# Patient Record
Sex: MALE | Race: WHITE | Employment: OTHER | URBAN - NONMETROPOLITAN AREA
[De-identification: names, ages, dates, MRNs, and addresses within clinical notes are randomized per-mention and may not be internally consistent; named-entity substitution may affect disease eponyms.]

---

## 2017-02-17 DIAGNOSIS — I10 HTN (HYPERTENSION): ICD-10-CM

## 2017-02-17 DIAGNOSIS — K21.9 ESOPHAGEAL REFLUX: ICD-10-CM

## 2017-02-17 RX ORDER — METOPROLOL SUCCINATE 25 MG/1
TABLET, EXTENDED RELEASE ORAL
Qty: 90 TABLET | Refills: 2 | Status: SHIPPED | OUTPATIENT
Start: 2017-02-17 | End: 2017-11-14

## 2017-11-14 DIAGNOSIS — I10 HTN (HYPERTENSION): ICD-10-CM

## 2017-11-14 DIAGNOSIS — K21.9 ESOPHAGEAL REFLUX: ICD-10-CM

## 2017-11-16 RX ORDER — METOPROLOL SUCCINATE 25 MG/1
TABLET, EXTENDED RELEASE ORAL
Qty: 90 TABLET | Refills: 0 | Status: SHIPPED | OUTPATIENT
Start: 2017-11-16 | End: 2018-02-11

## 2017-11-16 NOTE — TELEPHONE ENCOUNTER
Toprol XL      Last Written Prescription Date: 2/17/2017  Last Fill Quantity: 90,  # refills: 2   Last Office Visit with [facility], [facility] or [facility] prescribing provider: 11/28/2016    Prilosec      Last Written Prescription Date: 2/17/2017  Last Fill Quantity: 90,  # refills: 2   Last Office Visit with [facility], [facility] or [facility] prescribing provider: 11/28/2016

## 2017-12-15 ENCOUNTER — OFFICE VISIT (OUTPATIENT)
Dept: FAMILY MEDICINE | Facility: OTHER | Age: 62
End: 2017-12-15
Attending: FAMILY MEDICINE
Payer: COMMERCIAL

## 2017-12-15 ENCOUNTER — RADIANT APPOINTMENT (OUTPATIENT)
Dept: GENERAL RADIOLOGY | Facility: OTHER | Age: 62
End: 2017-12-15
Attending: FAMILY MEDICINE
Payer: COMMERCIAL

## 2017-12-15 VITALS
DIASTOLIC BLOOD PRESSURE: 78 MMHG | TEMPERATURE: 96.9 F | HEART RATE: 73 BPM | WEIGHT: 235 LBS | OXYGEN SATURATION: 93 % | BODY MASS INDEX: 34.8 KG/M2 | RESPIRATION RATE: 18 BRPM | HEIGHT: 69 IN | SYSTOLIC BLOOD PRESSURE: 126 MMHG

## 2017-12-15 DIAGNOSIS — M25.551 HIP PAIN, RIGHT: ICD-10-CM

## 2017-12-15 DIAGNOSIS — M25.551 HIP PAIN, RIGHT: Primary | ICD-10-CM

## 2017-12-15 DIAGNOSIS — I10 ESSENTIAL HYPERTENSION: ICD-10-CM

## 2017-12-15 DIAGNOSIS — Z00.00 ROUTINE GENERAL MEDICAL EXAMINATION AT A HEALTH CARE FACILITY: ICD-10-CM

## 2017-12-15 DIAGNOSIS — K21.9 GASTROESOPHAGEAL REFLUX DISEASE, ESOPHAGITIS PRESENCE NOT SPECIFIED: ICD-10-CM

## 2017-12-15 DIAGNOSIS — Z12.5 SCREENING FOR PROSTATE CANCER: ICD-10-CM

## 2017-12-15 DIAGNOSIS — Z12.11 SPECIAL SCREENING FOR MALIGNANT NEOPLASMS, COLON: ICD-10-CM

## 2017-12-15 LAB
ALBUMIN SERPL-MCNC: 4 G/DL (ref 3.4–5)
ALBUMIN UR-MCNC: 10 MG/DL
ALP SERPL-CCNC: 57 U/L (ref 40–150)
ALT SERPL W P-5'-P-CCNC: 35 U/L (ref 0–70)
ANION GAP SERPL CALCULATED.3IONS-SCNC: 4 MMOL/L (ref 3–14)
APPEARANCE UR: CLEAR
AST SERPL W P-5'-P-CCNC: 26 U/L (ref 0–45)
BACTERIA #/AREA URNS HPF: ABNORMAL /HPF
BASOPHILS # BLD AUTO: 0.1 10E9/L (ref 0–0.2)
BASOPHILS NFR BLD AUTO: 0.9 %
BILIRUB SERPL-MCNC: 0.5 MG/DL (ref 0.2–1.3)
BILIRUB UR QL STRIP: NEGATIVE
BUN SERPL-MCNC: 16 MG/DL (ref 7–30)
CALCIUM SERPL-MCNC: 8.6 MG/DL (ref 8.5–10.1)
CHLORIDE SERPL-SCNC: 104 MMOL/L (ref 94–109)
CHOLEST SERPL-MCNC: 184 MG/DL
CO2 SERPL-SCNC: 31 MMOL/L (ref 20–32)
COLOR UR AUTO: YELLOW
CREAT SERPL-MCNC: 1.16 MG/DL (ref 0.66–1.25)
DIFFERENTIAL METHOD BLD: NORMAL
EOSINOPHIL # BLD AUTO: 0.1 10E9/L (ref 0–0.7)
EOSINOPHIL NFR BLD AUTO: 1.8 %
ERYTHROCYTE [DISTWIDTH] IN BLOOD BY AUTOMATED COUNT: 12.4 % (ref 10–15)
GFR SERPL CREATININE-BSD FRML MDRD: 63 ML/MIN/1.7M2
GLUCOSE SERPL-MCNC: 105 MG/DL (ref 70–99)
GLUCOSE UR STRIP-MCNC: NEGATIVE MG/DL
HCT VFR BLD AUTO: 47.9 % (ref 40–53)
HDLC SERPL-MCNC: 52 MG/DL
HGB BLD-MCNC: 16.6 G/DL (ref 13.3–17.7)
HGB UR QL STRIP: NEGATIVE
IMM GRANULOCYTES # BLD: 0 10E9/L (ref 0–0.4)
IMM GRANULOCYTES NFR BLD: 0.2 %
KETONES UR STRIP-MCNC: NEGATIVE MG/DL
LDLC SERPL CALC-MCNC: 96 MG/DL
LEUKOCYTE ESTERASE UR QL STRIP: NEGATIVE
LYMPHOCYTES # BLD AUTO: 1.6 10E9/L (ref 0.8–5.3)
LYMPHOCYTES NFR BLD AUTO: 29.2 %
MCH RBC QN AUTO: 32.4 PG (ref 26.5–33)
MCHC RBC AUTO-ENTMCNC: 34.7 G/DL (ref 31.5–36.5)
MCV RBC AUTO: 94 FL (ref 78–100)
MONOCYTES # BLD AUTO: 0.5 10E9/L (ref 0–1.3)
MONOCYTES NFR BLD AUTO: 10 %
MUCOUS THREADS #/AREA URNS LPF: PRESENT /LPF
NEUTROPHILS # BLD AUTO: 3.1 10E9/L (ref 1.6–8.3)
NEUTROPHILS NFR BLD AUTO: 57.9 %
NITRATE UR QL: NEGATIVE
NONHDLC SERPL-MCNC: 132 MG/DL
NRBC # BLD AUTO: 0 10*3/UL
NRBC BLD AUTO-RTO: 0 /100
PH UR STRIP: 6 PH (ref 4.7–8)
PLATELET # BLD AUTO: 218 10E9/L (ref 150–450)
POTASSIUM SERPL-SCNC: 4 MMOL/L (ref 3.4–5.3)
PROT SERPL-MCNC: 7.6 G/DL (ref 6.8–8.8)
PSA SERPL-ACNC: 1.32 UG/L (ref 0–4)
RBC # BLD AUTO: 5.12 10E12/L (ref 4.4–5.9)
RBC #/AREA URNS AUTO: 1 /HPF (ref 0–2)
SODIUM SERPL-SCNC: 139 MMOL/L (ref 133–144)
SOURCE: ABNORMAL
SP GR UR STRIP: 1.02 (ref 1–1.03)
TRIGL SERPL-MCNC: 181 MG/DL
TSH SERPL DL<=0.005 MIU/L-ACNC: 2.04 MU/L (ref 0.4–4)
UROBILINOGEN UR STRIP-MCNC: NORMAL MG/DL (ref 0–2)
WBC # BLD AUTO: 5.4 10E9/L (ref 4–11)
WBC #/AREA URNS AUTO: <1 /HPF (ref 0–2)

## 2017-12-15 PROCEDURE — 80061 LIPID PANEL: CPT | Performed by: FAMILY MEDICINE

## 2017-12-15 PROCEDURE — 80050 GENERAL HEALTH PANEL: CPT | Performed by: FAMILY MEDICINE

## 2017-12-15 PROCEDURE — 81001 URINALYSIS AUTO W/SCOPE: CPT | Performed by: FAMILY MEDICINE

## 2017-12-15 PROCEDURE — 36415 COLL VENOUS BLD VENIPUNCTURE: CPT | Performed by: FAMILY MEDICINE

## 2017-12-15 PROCEDURE — 73502 X-RAY EXAM HIP UNI 2-3 VIEWS: CPT | Mod: TC

## 2017-12-15 PROCEDURE — G0103 PSA SCREENING: HCPCS | Performed by: FAMILY MEDICINE

## 2017-12-15 PROCEDURE — 99396 PREV VISIT EST AGE 40-64: CPT | Performed by: FAMILY MEDICINE

## 2017-12-15 ASSESSMENT — ANXIETY QUESTIONNAIRES
6. BECOMING EASILY ANNOYED OR IRRITABLE: SEVERAL DAYS
3. WORRYING TOO MUCH ABOUT DIFFERENT THINGS: SEVERAL DAYS
7. FEELING AFRAID AS IF SOMETHING AWFUL MIGHT HAPPEN: NOT AT ALL
2. NOT BEING ABLE TO STOP OR CONTROL WORRYING: SEVERAL DAYS
5. BEING SO RESTLESS THAT IT IS HARD TO SIT STILL: SEVERAL DAYS
1. FEELING NERVOUS, ANXIOUS, OR ON EDGE: SEVERAL DAYS
IF YOU CHECKED OFF ANY PROBLEMS ON THIS QUESTIONNAIRE, HOW DIFFICULT HAVE THESE PROBLEMS MADE IT FOR YOU TO DO YOUR WORK, TAKE CARE OF THINGS AT HOME, OR GET ALONG WITH OTHER PEOPLE: NOT DIFFICULT AT ALL
4. TROUBLE RELAXING: SEVERAL DAYS
GAD7 TOTAL SCORE: 6

## 2017-12-15 ASSESSMENT — PATIENT HEALTH QUESTIONNAIRE - PHQ9: SUM OF ALL RESPONSES TO PHQ QUESTIONS 1-9: 4

## 2017-12-15 ASSESSMENT — PAIN SCALES - GENERAL: PAINLEVEL: NO PAIN (0)

## 2017-12-15 NOTE — MR AVS SNAPSHOT
After Visit Summary   12/15/2017    Luis Perez    MRN: [id number]           Patient Information     Date Of Birth          1955        Visit Information        Provider Department      12/15/2017 9:00 AM Kenneth Yuan MD [facility] [place]        Today's Diagnoses     Hip pain, right    -  1    Comprehensive Medical Examination        Screening for prostate cancer        Special screening for malignant neoplasms, colon        HTN (hypertension)        GERD          Care Instructions      Preventive Health Recommendations  Male Ages 50 - 64    Yearly exam:             See your health care provider every year in order to  o   Review health changes.   o   Discuss preventive care.    o   Review your medicines if your doctor has prescribed any.     Have a cholesterol test every 5 years, or more frequently if you are at risk for high cholesterol/heart disease.     Have a diabetes test (fasting glucose) every three years. If you are at risk for diabetes, you should have this test more often.     Have a colonoscopy at age 50, or have a yearly FIT test (stool test). These exams will check for colon cancer.      Talk with your health care provider about whether or not a prostate cancer screening test (PSA) is right for you.    You should be tested each year for STDs (sexually transmitted diseases), if you re at risk.     Shots: Get a flu shot each year. Get a tetanus shot every 10 years.     Nutrition:    Eat at least 5 servings of fruits and vegetables daily.     Eat whole-grain bread, whole-wheat pasta and brown rice instead of white grains and rice.     Talk to your provider about Calcium and Vitamin D.     Lifestyle    Exercise for at least 150 minutes a week (30 minutes a day, 5 days a week). This will help you control your weight and prevent disease.     Limit alcohol to one drink per day.     No smoking.     Wear sunscreen to prevent skin cancer.     See your dentist every six months  for an exam and cleaning.     See your eye doctor every 1 to 2 years.            Follow-ups after your visit        Additional Services     GENERAL SURG ADULT REFERRAL       Your provider has referred you to: [facility]: [facility] [place] [phone]   [email or web address]    Please be aware that coverage of these services is subject to the terms and limitations of your health insurance plan.  Call member services at your health plan with any benefit or coverage questions.      Please bring the following with you to your appointment:    (1) Any X-Rays, CTs or MRIs which have been performed.  Contact the facility where they were done to arrange for  prior to your scheduled appointment.   (2) List of current medications   (3) This referral request   (4) Any documents/labs given to you for this referral                  Follow-up notes from your care team     Return in about 1 year (around 12/15/2018) for Comprehensive Exam.      Who to contact     If you have questions or need follow up information about today's clinic visit or your schedule please [facility] at [phone].  Normal or non-critical lab and imaging results will be communicated to you by MyChart, letter or phone within 4 business days after the clinic has received the results. If you do not hear from us within 7 days, please contact the clinic through Spazzleshart or phone. If you have a critical or abnormal lab result, we will notify you by phone as soon as possible.  Submit refill requests through Zoomdata or call your pharmacy and they will forward the refill request to us. Please allow 3 business days for your refill to be completed.          Additional Information About Your Visit        Spazzleshart Information     Zoomdata gives you secure access to your electronic health record. If you see a primary care provider, you can also send messages to your care team  "and make appointments. If you have questions, please call your primary care clinic.  If you do not have a primary care provider, please call [phone] and they will assist you.        Care EveryWhere ID     This is your Care EveryWhere ID. This could be used by other organizations to access your [facility] medical records  [id number]        Your Vitals Were     Pulse Temperature Respirations Height Pulse Oximetry BMI (Body Mass Index)    73 96.9  F (36.1  C) (Tympanic) 18 5' 8.5\" (1.74 m) 93% 35.21 kg/m2       Blood Pressure from Last 3 Encounters:   12/15/17 126/78   11/28/16 130/88   11/18/15 120/76    Weight from Last 3 Encounters:   12/15/17 235 lb (106.6 kg)   11/28/16 240 lb (108.9 kg)   11/18/15 246 lb (111.6 kg)              We Performed the Following     CBC with platelets differential     Comprehensive metabolic panel     GENERAL SURG ADULT REFERRAL     Lipid Profile     Prostate spec antigen screen     TSH with free T4 reflex     UA reflex to Microscopic and Culture        Primary Care Provider Office Phone # Fax #    Kenneth Yuan [phone]       [facility] [address] [facility] [address]        Equal Access to Services     SINDI WOOD AH: Hadii ras ku hadasho Soomaali, waaxda luqadaha, qaybta kaalmada adeegyada, jason alcazar. So [facility] [phone].    ATENCIÓN: Si habla español, tiene a smith disposición servicios gratuitos de asistencia lingüística. Llame al [phone].    We comply with applicable federal civil rights laws and [place] laws. We do not discriminate on the basis of race, color, national origin, age, disability, sex, sexual orientation, or gender identity.            Thank you!     Thank you for choosing [facility]  for your care. Our goal is always to provide you with excellent care. Hearing back from our patients is one way we can continue to improve our services. Please take a few minutes to complete the written " survey that you may receive in the mail after your visit with us. Thank you!             Your Updated Medication List - Protect others around you: Learn how to safely use, store and throw away your medicines at www.disposemymeds.org.          This list is accurate as of: 12/15/17 11:59 PM.  Always use your most recent med list.                   Brand Name Dispense Instructions for use Diagnosis    ASPIRIN LOW DOSE 81 MG EC tablet   Generic drug:  aspirin      Take 1 tablet by mouth daily.        FLAXSEED OIL PO      Take 1,400 mg by mouth daily        metoprolol 25 MG 24 hr tablet    TOPROL-XL    90 tablet    TAKE 1 TABLET DAILY    HTN (hypertension)       omeprazole 20 MG CR capsule    priLOSEC    90 capsule    TAKE 1 CAPSULE DAILY BEFORE A MEAL    Esophageal reflux       vitamin D3 2000 UNITS Caps      Take 1 capsule by mouth daily

## 2017-12-15 NOTE — NURSING NOTE
"Chief Complaint   Patient presents with     Physical       Initial /78 (BP Location: Right arm, Patient Position: Sitting, Cuff Size: Adult Regular)  Pulse 73  Temp 96.9  F (36.1  C) (Tympanic)  Resp 18  Ht 5' 8.5\" (1.74 m)  Wt 235 lb (106.6 kg)  SpO2 93%  BMI 35.21 kg/m2 Estimated body mass index is 35.21 kg/(m^2) as calculated from the following:    Height as of this encounter: 5' 8.5\" (1.74 m).    Weight as of this encounter: 235 lb (106.6 kg).  Medication Reconciliation: complete   Jennifer Cabrera LPN      "

## 2017-12-15 NOTE — PROGRESS NOTES
SUBJECTIVE:   CC: Luis Perez is an 62 year old male who presents for preventative health visit.     Healthy Habits:    Do you get at least three servings of calcium containing foods daily (dairy, green leafy vegetables, etc.)? yes    Amount of exercise or daily activities, outside of work: 2-3 day(s) per week    Problems taking medications regularly No    Medication side effects: No    Have you had an eye exam in the past two years? yes    Do you see a dentist twice per year? yes    Do you have sleep apnea, excessive snoring or daytime drowsiness?no        Today's PHQ-2 Score: PHQ-2 ( 1999 Pfizer) 12/12/2017 11/18/2014   Q1: Little interest or pleasure in doing things 1 0   Q2: Feeling down, depressed or hopeless 0 0   PHQ-2 Score 1 0   Q1: Little interest or pleasure in doing things Several days -   Q2: Feeling down, depressed or hopeless Not at all -   PHQ-2 Score 1 -         Abuse: Current or Past(Physical, Sexual or Emotional)- No  Do you feel safe in your environment - Yes  Social History   Substance Use Topics     Smoking status: Light Tobacco Smoker     Years: 37.00     Types: Cigars     Smokeless tobacco: Never Used     Alcohol use Yes      Comment: 2 glasses wine daily      If you drink alcohol do you typically have >3 drinks per day or >7 drinks per week? No                      Last PSA:   PSA   Date Value Ref Range Status   11/28/2016 1.30 0 - 4 ug/L Final     Comment:     Assay Method:  Chemiluminescence using Siemens Vista analyzer       Reviewed orders with patient. Reviewed health maintenance and updated orders accordingly - Yes  Patient Active Problem List   Diagnosis     Advanced care planning/counseling discussion     HTN (hypertension)     GERD     Comprehensive Medical Examination     Past Surgical History:   Procedure Laterality Date     COLONOSCOPY  9-     UPPER GI ENDOSCOPY         Social History   Substance Use Topics     Smoking status: Light Tobacco Smoker     Years: 37.00      Types: Cigars     Smokeless tobacco: Never Used     Alcohol use Yes      Comment: 2 glasses wine daily     Family History   Problem Relation Age of Onset     Allergies Sister      Allergies Brother      Asthma Brother      CANCER Father      Non-Hodgkin's lymphoma - cause of death     DIABETES Mother      Hypertension Mother      Hypertension Brother      HEART DISEASE No family hx of      Thyroid Disease No family hx of      Cancer - colorectal No family hx of          Current Outpatient Prescriptions   Medication Sig Dispense Refill     metoprolol (TOPROL-XL) 25 MG 24 hr tablet TAKE 1 TABLET DAILY 90 tablet 0     omeprazole (PRILOSEC) 20 MG CR capsule TAKE 1 CAPSULE DAILY BEFORE A MEAL 90 capsule 0     Flaxseed, Linseed, (FLAXSEED OIL PO) Take 1,400 mg by mouth daily       Cholecalciferol (VITAMIN D3) 2000 UNITS CAPS Take 1 capsule by mouth daily       aspirin (ASPIRIN LOW DOSE) 81 MG EC tablet Take 1 tablet by mouth daily.       No Known Allergies      Reviewed and updated as needed this visit by clinical staffTobacco  Allergies  Meds         Reviewed and updated as needed this visit by Provider        Past Medical History:   Diagnosis Date     GERD 2/22/2000     Hypertension 6/3/2005      Past Surgical History:   Procedure Laterality Date     COLONOSCOPY  9-     UPPER GI ENDOSCOPY         ROS:  C: NEGATIVE for fever, chills, change in weight  I: NEGATIVE for worrisome rashes, moles or lesions  E: NEGATIVE for vision changes or irritation  ENT: NEGATIVE for ear, mouth and throat problems  R: NEGATIVE for significant cough or SOB  CV: NEGATIVE for chest pain, palpitations or peripheral edema  GI: NEGATIVE for nausea, abdominal pain, heartburn, or change in bowel habits   male: negative for dysuria, hematuria, decreased urinary stream, erectile dysfunction, urethral discharge  M: NEGATIVE for significant arthralgias or myalgia  N: NEGATIVE for weakness, dizziness or paresthesias  P: NEGATIVE for changes  "in mood or affect    OBJECTIVE:   /78 (BP Location: Right arm, Patient Position: Sitting, Cuff Size: Adult Regular)  Pulse 73  Temp 96.9  F (36.1  C) (Tympanic)  Resp 18  Ht 5' 8.5\" (1.74 m)  Wt 235 lb (106.6 kg)  SpO2 93%  BMI 35.21 kg/m2  EXAM:  Physical Exam   Constitutional: He is oriented to person, place, and time. He appears well-developed and well-nourished. No distress.   HENT:   Head: Normocephalic and atraumatic.   Right Ear: External ear normal.   Left Ear: External ear normal.   Nose: Nose normal.   Mouth/Throat: Oropharynx is clear and moist.   Eyes: Conjunctivae and EOM are normal. Pupils are equal, round, and reactive to light.   Neck: Normal range of motion. Neck supple. No JVD present. No thyromegaly present.   Cardiovascular: Normal rate, regular rhythm, normal heart sounds and intact distal pulses.  Exam reveals no gallop and no friction rub.    No murmur heard.  Pulmonary/Chest: Effort normal and breath sounds normal. He has no wheezes. He has no rales.   Abdominal: Soft. Bowel sounds are normal. He exhibits no mass. There is no tenderness.   Genitourinary: Rectum normal, prostate normal and penis normal.   Musculoskeletal: Normal range of motion.   Lymphadenopathy:     He has no cervical adenopathy.   Neurological: He is alert and oriented to person, place, and time. He has normal reflexes.   Skin: Skin is warm and dry.   Psychiatric: He has a normal mood and affect.         ASSESSMENT/PLAN:   1. Comprehensive Medical Examination  Appropriate screening labs are drawn.  Most recent colonoscopy and immunizations are reviewed.  Risk factors, aspirin use, screening recommendations, and follow up discussed.  Routine exam in 1 year.      2. Screening for prostate cancer  PSA drawn  - Prostate spec antigen screen    3. Special screening for malignant neoplasms, colon  General Surgery for colonoscopy  - GENERAL SURG ADULT REFERRAL    4. HTN (hypertension)  Goals reviewed.  Continue home BP " "monitoring and same medication regimen.  Follow up 12 months.   - CBC with platelets differential  - Lipid Profile  - Comprehensive metabolic panel  - TSH with free T4 reflex  - UA reflex to Microscopic and Culture    5. GERD  Stable    6. Hip pain, right  X rays show mild osteoarthritis  - XR HIP RT G/E 2 VW (Clinic Performed); Future    COUNSELING:  Reviewed preventive health counseling, as reflected in patient instructions  Special attention given to:        Regular exercise       Healthy diet/nutrition     reports that he has been smoking Cigars.  He has smoked for the past 37.00 years. He has never used smokeless tobacco.      Estimated body mass index is 35.21 kg/(m^2) as calculated from the following:    Height as of this encounter: 5' 8.5\" (1.74 m).    Weight as of this encounter: 235 lb (106.6 kg).   Weight management plan: Discussed healthy diet and exercise guidelines and patient will follow up in 12 months in clinic to re-evaluate.    Counseling Resources:  ATP IV Guidelines  Pooled Cohorts Equation Calculator  FRAX Risk Assessment  ICSI Preventive Guidelines  Dietary Guidelines for Americans, 2010  USDA's MyPlate  ASA Prophylaxis  Lung CA Screening    Kenneth Yuan MD  [facility] [place]  Answers for HPI/ROS submitted by the patient on 12/12/2017   Annual Exam:  Getting at least 3 servings of Calcium per day:: Yes  Bi-annual eye exam:: Yes  Dental care twice a year:: Yes  Sleep apnea or symptoms of sleep apnea:: None  Diet:: Regular (no restrictions)  Frequency of exercise:: 1 day/week  Taking medications regularly:: Yes  Medication side effects:: None  Additional concerns today:: No  PHQ-2 Score: 1  Duration of exercise:: 15-30 minutes    "

## 2017-12-16 ASSESSMENT — ANXIETY QUESTIONNAIRES: GAD7 TOTAL SCORE: 6

## 2017-12-28 ENCOUNTER — TELEPHONE (OUTPATIENT)
Dept: SURGERY | Facility: OTHER | Age: 62
End: 2017-12-28

## 2017-12-28 NOTE — TELEPHONE ENCOUNTER
Called patient to schedule colonoscopy (Meet and Greet).  He said he told Dr Yuan he is not ready to schedule it yet and he will call us when he is ready.

## 2018-02-11 DIAGNOSIS — I10 HTN (HYPERTENSION): ICD-10-CM

## 2018-02-11 DIAGNOSIS — K21.9 ESOPHAGEAL REFLUX: ICD-10-CM

## 2018-02-12 RX ORDER — METOPROLOL SUCCINATE 25 MG/1
TABLET, EXTENDED RELEASE ORAL
Qty: 90 TABLET | Refills: 2 | Status: SHIPPED | OUTPATIENT
Start: 2018-02-12 | End: 2018-11-11

## 2018-02-12 NOTE — TELEPHONE ENCOUNTER
Metoprolol  Last Office Visit: 12/15/17  Last Refill Date:11/16/17  # 90          Refills  0    Prilosec  Last Office Visit: 12/15/17  Last Refill Date:11/16/17  # 90          Refills  0      Thank you!

## 2018-03-08 ENCOUNTER — TELEPHONE (OUTPATIENT)
Dept: SURGERY | Facility: OTHER | Age: 63
End: 2018-03-08

## 2018-03-08 DIAGNOSIS — Z12.11 SPECIAL SCREENING FOR MALIGNANT NEOPLASMS, COLON: Primary | ICD-10-CM

## 2018-03-08 DIAGNOSIS — Z01.818 ENCOUNTER FOR PREOPERATIVE EXAMINATION FOR GENERAL SURGICAL PROCEDURE: Primary | ICD-10-CM

## 2018-03-08 RX ORDER — SODIUM, POTASSIUM,MAG SULFATES 17.5-3.13G
2 SOLUTION, RECONSTITUTED, ORAL ORAL SEE ADMIN INSTRUCTIONS
Qty: 2 BOTTLE | Refills: 0 | Status: SHIPPED | OUTPATIENT
Start: 2018-03-08 | End: 2018-12-18

## 2018-03-08 NOTE — TELEPHONE ENCOUNTER
Patient contacted regarding a referral sent by Kwabena for a meet and greet colonoscopy.  Patient has chosen 3/15/18 as the date for their meet and greet colonoscopy with Dr Hill at [place].    All information regarding the bowel prep, same day surgery and our [facility] Handbook has been sent to the patient via mail.  Numbers to the surgery department have been provided to the patient in case questions should arise or a date needs to be rescheduled.

## 2018-03-08 NOTE — TELEPHONE ENCOUNTER
Patient called, stated he received a letter to call back. Patient is ready to schedule this meet and greet colonoscopy. Please call patient back at [phone]

## 2018-03-12 DIAGNOSIS — Z01.818 ENCOUNTER FOR PREOPERATIVE EXAMINATION FOR GENERAL SURGICAL PROCEDURE: ICD-10-CM

## 2018-03-15 ENCOUNTER — OFFICE VISIT (OUTPATIENT)
Dept: ANESTHESIOLOGY | Facility: HOSPITAL | Age: 63
End: 2018-03-15
Attending: SURGERY
Payer: COMMERCIAL

## 2018-03-15 ENCOUNTER — TRANSFERRED RECORDS (OUTPATIENT)
Dept: HEALTH INFORMATION MANAGEMENT | Facility: CLINIC | Age: 63
End: 2018-03-15

## 2018-03-15 ENCOUNTER — RESULTS ONLY (OUTPATIENT)
Dept: LAB | Age: 63
End: 2018-03-15

## 2018-03-15 ENCOUNTER — APPOINTMENT (OUTPATIENT)
Dept: LAB | Facility: HOSPITAL | Age: 63
End: 2018-03-15
Attending: SURGERY
Payer: COMMERCIAL

## 2018-03-15 PROCEDURE — 45380 COLONOSCOPY AND BIOPSY: CPT | Mod: 59 | Performed by: SURGERY

## 2018-03-15 PROCEDURE — 00811 ANES LWR INTST NDSC NOS: CPT | Mod: QZ | Performed by: NURSE ANESTHETIST, CERTIFIED REGISTERED

## 2018-03-15 PROCEDURE — 45385 COLONOSCOPY W/LESION REMOVAL: CPT | Mod: PT | Performed by: SURGERY

## 2018-03-19 LAB — COPATH REPORT: NORMAL

## 2018-12-14 NOTE — PROGRESS NOTES
SUBJECTIVE:   Luis Perez is a 63 year old male who presents to clinic today for the following health issues:    Hypertension Follow-up      Outpatient blood pressures are not being checked.    Low Salt Diet: no added salt      Amount of exercise or physical activity: 6-7 days/week for an average of 15-30 minutes    Problems taking medications regularly: No    Medication side effects: none    Diet: regular (no restrictions)      GERD/Heartburn      Duration: ongoing concern    Description (location/character/radiation): on prescribed medication    Intensity:  mild    Accompanying signs and symptoms:  food getting stuck: no   nausea/vomiting/blood: no   abdominal pain: no   black/tarry or bloody stools: no :    History (similar episodes/previous evaluation): see medical record    Precipitating or alleviating factors:  worse with no particular food or drink.  current NSAID/Aspirin use: YES    Therapies tried and outcome: Omeprazole (Prilosec)      Problem list and histories reviewed & adjusted, as indicated.  Additional history: as documented    Patient Active Problem List   Diagnosis     Advanced care planning/counseling discussion     HTN (hypertension)     GERD     Comprehensive Medical Examination     Screening for prostate cancer     Special screening for malignant neoplasms, colon     Past Surgical History:   Procedure Laterality Date     COLONOSCOPY  9-     COLONOSCOPY  03/15/2018    repeat colonoscopy in 5 years     UPPER GI ENDOSCOPY         Social History     Tobacco Use     Smoking status: Light Tobacco Smoker     Years: 37.00     Types: Cigars     Smokeless tobacco: Never Used   Substance Use Topics     Alcohol use: Yes     Comment: 2 glasses wine daily     Family History   Problem Relation Age of Onset     Cancer Father         Non-Hodgkin's lymphoma - cause of death     Diabetes Mother      Hypertension Mother      Allergies Sister      Allergies Brother      Asthma Brother      Hypertension  Brother      Heart Disease No family hx of      Thyroid Disease No family hx of      Cancer - colorectal No family hx of          Current Outpatient Medications   Medication Sig Dispense Refill     aspirin (ASPIRIN LOW DOSE) 81 MG EC tablet Take 1 tablet by mouth daily.       Cholecalciferol (VITAMIN D3) 2000 UNITS CAPS Take 1 capsule by mouth daily       Flaxseed, Linseed, (FLAXSEED OIL PO) Take 1,400 mg by mouth daily       metoprolol succinate (TOPROL-XL) 25 MG 24 hr tablet TAKE 1 TABLET DAILY 90 tablet 0     omeprazole (PRILOSEC) 20 MG CR capsule TAKE 1 CAPSULE DAILY BEFORE A MEAL 90 capsule 0     No Known Allergies  BP Readings from Last 3 Encounters:   12/18/18 140/88   12/15/17 126/78   11/28/16 130/88    Wt Readings from Last 3 Encounters:   12/18/18 106.6 kg (235 lb)   12/15/17 106.6 kg (235 lb)   11/28/16 108.9 kg (240 lb)                    Reviewed and updated as needed this visit by clinical staff  Tobacco  Allergies  Meds  Med Hx  Surg Hx  Fam Hx  Soc Hx      Reviewed and updated as needed this visit by Provider         ROS:  Constitutional, HEENT, cardiovascular, pulmonary, gi and gu systems are negative, except as otherwise noted.    OBJECTIVE:     /88 (BP Location: Left arm, Patient Position: Sitting, Cuff Size: Adult Large)   Pulse 67   Temp 96.8  F (36  C) (Tympanic)   Wt 106.6 kg (235 lb)   SpO2 97%   BMI 35.21 kg/m    Body mass index is 35.21 kg/m .  Physical Exam   Constitutional: He is oriented to person, place, and time. He appears well-developed and well-nourished. No distress.   HENT:   Head: Normocephalic.   Right Ear: Tympanic membrane, external ear and ear canal normal.   Left Ear: Tympanic membrane, external ear and ear canal normal.   Nose: Nose normal.   Mouth/Throat: Oropharynx is clear and moist.   Eyes: Conjunctivae are normal.   Neck: Neck supple. No JVD present. No thyromegaly present.   Cardiovascular: Normal rate, regular rhythm, normal heart sounds and intact  distal pulses. Exam reveals no gallop and no friction rub.   No murmur heard.  Pulmonary/Chest: Effort normal and breath sounds normal. He has no rales.   Musculoskeletal: He exhibits no edema.   Neurological: He is alert and oriented to person, place, and time.   Skin: Skin is warm and dry.   Psychiatric: He has a normal mood and affect.         Diagnostic Test Results:  Results for orders placed or performed in visit on 12/18/18   TSH with free T4 reflex   Result Value Ref Range    TSH 1.07 0.40 - 4.00 mU/L   Comprehensive metabolic panel   Result Value Ref Range    Sodium 141 133 - 144 mmol/L    Potassium 4.2 3.4 - 5.3 mmol/L    Chloride 101 94 - 109 mmol/L    Carbon Dioxide 28 20 - 32 mmol/L    Anion Gap 12 3 - 14 mmol/L    Glucose 86 70 - 99 mg/dL    Urea Nitrogen 20 7 - 30 mg/dL    Creatinine 1.05 0.66 - 1.25 mg/dL    GFR Estimate 71 >60 mL/min/1.7m2    GFR Estimate If Black 86 >60 mL/min/1.7m2    Calcium 8.4 (L) 8.5 - 10.1 mg/dL    Bilirubin Total 0.3 0.2 - 1.3 mg/dL    Albumin 4.1 3.4 - 5.0 g/dL    Protein Total 7.8 6.8 - 8.8 g/dL    Alkaline Phosphatase 67 40 - 150 U/L    ALT 39 0 - 70 U/L    AST 24 0 - 45 U/L   Lipid Profile   Result Value Ref Range    Cholesterol 177 <200 mg/dL    Triglycerides 137 <150 mg/dL    HDL Cholesterol 49 >39 mg/dL    LDL Cholesterol Calculated 101 (H) <100 mg/dL    Non HDL Cholesterol 128 <130 mg/dL   Prostate spec antigen screen   Result Value Ref Range    PSA 1.56 0 - 4 ug/L   CBC with platelets differential   Result Value Ref Range    WBC 7.2 4.0 - 11.0 10e9/L    RBC Count 5.10 4.4 - 5.9 10e12/L    Hemoglobin 16.4 13.3 - 17.7 g/dL    Hematocrit 46.9 40.0 - 53.0 %    MCV 92 78 - 100 fl    MCH 32.2 26.5 - 33.0 pg    MCHC 35.0 31.5 - 36.5 g/dL    RDW 12.2 10.0 - 15.0 %    Platelet Count 247 150 - 450 10e9/L    Diff Method Automated Method     % Neutrophils 56.1 %    % Lymphocytes 31.5 %    % Monocytes 8.8 %    % Eosinophils 2.1 %    % Basophils 0.8 %    % Immature Granulocytes  0.7 %    Nucleated RBCs 0 0 /100    Absolute Neutrophil 4.0 1.6 - 8.3 10e9/L    Absolute Lymphocytes 2.3 0.8 - 5.3 10e9/L    Absolute Monocytes 0.6 0.0 - 1.3 10e9/L    Absolute Eosinophils 0.2 0.0 - 0.7 10e9/L    Absolute Basophils 0.1 0.0 - 0.2 10e9/L    Abs Immature Granulocytes 0.1 0 - 0.4 10e9/L    Absolute Nucleated RBC 0.0    UA reflex to Microscopic and Culture   Result Value Ref Range    Color Urine Yellow     Appearance Urine Clear     Glucose Urine Negative NEG^Negative mg/dL    Bilirubin Urine Negative NEG^Negative    Ketones Urine Negative NEG^Negative mg/dL    Specific Gravity Urine 1.011 1.003 - 1.035    Blood Urine Negative NEG^Negative    pH Urine 5.5 4.7 - 8.0 pH    Protein Albumin Urine Negative NEG^Negative mg/dL    Urobilinogen mg/dL Normal 0.0 - 2.0 mg/dL    Nitrite Urine Negative NEG^Negative    Leukocyte Esterase Urine Negative NEG^Negative    Source Midstream Urine        ASSESSMENT/PLAN:     Essential hypertension  Goals reviewed.  Continue home BP monitoring and same medication regimen.  Follow up 6 months.    - TSH with free T4 reflex  - Comprehensive metabolic panel  - Lipid Profile  - CBC with platelets differential  - UA reflex to Microscopic and Culture    Gastroesophageal reflux disease with esophagitis  Stable.  Continue proton pump inhibitor.    Screening for prostate cancer  PSA drawn.  - Prostate spec antigen screen    Kenneth Yuan MD  [facility] [place]

## 2018-12-18 ENCOUNTER — OFFICE VISIT (OUTPATIENT)
Dept: FAMILY MEDICINE | Facility: OTHER | Age: 63
End: 2018-12-18
Attending: FAMILY MEDICINE
Payer: COMMERCIAL

## 2018-12-18 VITALS
DIASTOLIC BLOOD PRESSURE: 88 MMHG | TEMPERATURE: 96.8 F | WEIGHT: 235 LBS | HEART RATE: 67 BPM | BODY MASS INDEX: 35.21 KG/M2 | SYSTOLIC BLOOD PRESSURE: 140 MMHG | OXYGEN SATURATION: 97 %

## 2018-12-18 DIAGNOSIS — K21.00 GASTROESOPHAGEAL REFLUX DISEASE WITH ESOPHAGITIS: ICD-10-CM

## 2018-12-18 DIAGNOSIS — Z12.5 SCREENING FOR PROSTATE CANCER: ICD-10-CM

## 2018-12-18 DIAGNOSIS — I10 ESSENTIAL HYPERTENSION: Primary | ICD-10-CM

## 2018-12-18 PROBLEM — E66.01 MORBID OBESITY (H): Status: ACTIVE | Noted: 2018-12-18

## 2018-12-18 LAB
ALBUMIN SERPL-MCNC: 4.1 G/DL (ref 3.4–5)
ALBUMIN UR-MCNC: NEGATIVE MG/DL
ALP SERPL-CCNC: 67 U/L (ref 40–150)
ALT SERPL W P-5'-P-CCNC: 39 U/L (ref 0–70)
ANION GAP SERPL CALCULATED.3IONS-SCNC: 12 MMOL/L (ref 3–14)
APPEARANCE UR: CLEAR
AST SERPL W P-5'-P-CCNC: 24 U/L (ref 0–45)
BASOPHILS # BLD AUTO: 0.1 10E9/L (ref 0–0.2)
BASOPHILS NFR BLD AUTO: 0.8 %
BILIRUB SERPL-MCNC: 0.3 MG/DL (ref 0.2–1.3)
BILIRUB UR QL STRIP: NEGATIVE
BUN SERPL-MCNC: 20 MG/DL (ref 7–30)
CALCIUM SERPL-MCNC: 8.4 MG/DL (ref 8.5–10.1)
CHLORIDE SERPL-SCNC: 101 MMOL/L (ref 94–109)
CHOLEST SERPL-MCNC: 177 MG/DL
CO2 SERPL-SCNC: 28 MMOL/L (ref 20–32)
COLOR UR AUTO: YELLOW
CREAT SERPL-MCNC: 1.05 MG/DL (ref 0.66–1.25)
DIFFERENTIAL METHOD BLD: NORMAL
EOSINOPHIL # BLD AUTO: 0.2 10E9/L (ref 0–0.7)
EOSINOPHIL NFR BLD AUTO: 2.1 %
ERYTHROCYTE [DISTWIDTH] IN BLOOD BY AUTOMATED COUNT: 12.2 % (ref 10–15)
GFR SERPL CREATININE-BSD FRML MDRD: 71 ML/MIN/1.7M2
GLUCOSE SERPL-MCNC: 86 MG/DL (ref 70–99)
GLUCOSE UR STRIP-MCNC: NEGATIVE MG/DL
HCT VFR BLD AUTO: 46.9 % (ref 40–53)
HDLC SERPL-MCNC: 49 MG/DL
HGB BLD-MCNC: 16.4 G/DL (ref 13.3–17.7)
HGB UR QL STRIP: NEGATIVE
IMM GRANULOCYTES # BLD: 0.1 10E9/L (ref 0–0.4)
IMM GRANULOCYTES NFR BLD: 0.7 %
KETONES UR STRIP-MCNC: NEGATIVE MG/DL
LDLC SERPL CALC-MCNC: 101 MG/DL
LEUKOCYTE ESTERASE UR QL STRIP: NEGATIVE
LYMPHOCYTES # BLD AUTO: 2.3 10E9/L (ref 0.8–5.3)
LYMPHOCYTES NFR BLD AUTO: 31.5 %
MCH RBC QN AUTO: 32.2 PG (ref 26.5–33)
MCHC RBC AUTO-ENTMCNC: 35 G/DL (ref 31.5–36.5)
MCV RBC AUTO: 92 FL (ref 78–100)
MONOCYTES # BLD AUTO: 0.6 10E9/L (ref 0–1.3)
MONOCYTES NFR BLD AUTO: 8.8 %
NEUTROPHILS # BLD AUTO: 4 10E9/L (ref 1.6–8.3)
NEUTROPHILS NFR BLD AUTO: 56.1 %
NITRATE UR QL: NEGATIVE
NONHDLC SERPL-MCNC: 128 MG/DL
NRBC # BLD AUTO: 0 10*3/UL
NRBC BLD AUTO-RTO: 0 /100
PH UR STRIP: 5.5 PH (ref 4.7–8)
PLATELET # BLD AUTO: 247 10E9/L (ref 150–450)
POTASSIUM SERPL-SCNC: 4.2 MMOL/L (ref 3.4–5.3)
PROT SERPL-MCNC: 7.8 G/DL (ref 6.8–8.8)
PSA SERPL-ACNC: 1.56 UG/L (ref 0–4)
RBC # BLD AUTO: 5.1 10E12/L (ref 4.4–5.9)
SODIUM SERPL-SCNC: 141 MMOL/L (ref 133–144)
SOURCE: NORMAL
SP GR UR STRIP: 1.01 (ref 1–1.03)
TRIGL SERPL-MCNC: 137 MG/DL
TSH SERPL DL<=0.005 MIU/L-ACNC: 1.07 MU/L (ref 0.4–4)
UROBILINOGEN UR STRIP-MCNC: NORMAL MG/DL (ref 0–2)
WBC # BLD AUTO: 7.2 10E9/L (ref 4–11)

## 2018-12-18 PROCEDURE — 36415 COLL VENOUS BLD VENIPUNCTURE: CPT | Performed by: FAMILY MEDICINE

## 2018-12-18 PROCEDURE — 80050 GENERAL HEALTH PANEL: CPT | Performed by: FAMILY MEDICINE

## 2018-12-18 PROCEDURE — 81003 URINALYSIS AUTO W/O SCOPE: CPT | Performed by: FAMILY MEDICINE

## 2018-12-18 PROCEDURE — 80061 LIPID PANEL: CPT | Performed by: FAMILY MEDICINE

## 2018-12-18 PROCEDURE — 99214 OFFICE O/P EST MOD 30 MIN: CPT | Performed by: FAMILY MEDICINE

## 2018-12-18 PROCEDURE — G0103 PSA SCREENING: HCPCS | Performed by: FAMILY MEDICINE

## 2018-12-18 ASSESSMENT — PAIN SCALES - GENERAL: PAINLEVEL: NO PAIN (0)

## 2018-12-18 ASSESSMENT — ANXIETY QUESTIONNAIRES
5. BEING SO RESTLESS THAT IT IS HARD TO SIT STILL: NOT AT ALL
2. NOT BEING ABLE TO STOP OR CONTROL WORRYING: NOT AT ALL
7. FEELING AFRAID AS IF SOMETHING AWFUL MIGHT HAPPEN: SEVERAL DAYS
3. WORRYING TOO MUCH ABOUT DIFFERENT THINGS: NOT AT ALL
6. BECOMING EASILY ANNOYED OR IRRITABLE: NOT AT ALL

## 2018-12-18 ASSESSMENT — PATIENT HEALTH QUESTIONNAIRE - PHQ9
5. POOR APPETITE OR OVEREATING: NOT AT ALL
SUM OF ALL RESPONSES TO PHQ QUESTIONS 1-9: 4

## 2018-12-18 NOTE — NURSING NOTE
"Chief Complaint   Patient presents with     Hypertension     Gastrophageal Reflux       Initial /88 (BP Location: Left arm, Patient Position: Sitting, Cuff Size: Adult Large)   Pulse 67   Temp 96.8  F (36  C) (Tympanic)   Wt 106.6 kg (235 lb)   SpO2 97%   BMI 35.21 kg/m   Estimated body mass index is 35.21 kg/m  as calculated from the following:    Height as of 12/15/17: 1.74 m (5' 8.5\").    Weight as of this encounter: 106.6 kg (235 lb).  Medication Reconciliation: complete    Solange Araiza LPN  "

## 2019-02-10 DIAGNOSIS — K21.00 GASTROESOPHAGEAL REFLUX DISEASE WITH ESOPHAGITIS: ICD-10-CM

## 2019-02-10 DIAGNOSIS — I10 BENIGN ESSENTIAL HYPERTENSION: ICD-10-CM

## 2019-02-11 RX ORDER — METOPROLOL SUCCINATE 25 MG/1
TABLET, EXTENDED RELEASE ORAL
Qty: 90 TABLET | Refills: 2 | Status: SHIPPED | OUTPATIENT
Start: 2019-02-11 | End: 2019-03-11

## 2019-02-11 NOTE — TELEPHONE ENCOUNTER
metoprolol      Last Written Prescription Date:  11/13/18  Last Fill Quantity: 90,   # refills: 0  Last Office Visit: 12/18/18    prilosec      Last Written Prescription Date:  11/13/18  Last Fill Quantity: 90,   # refills: 0  Last Office Visit: 12/18/18

## 2019-03-09 ENCOUNTER — MYC MEDICAL ADVICE (OUTPATIENT)
Dept: FAMILY MEDICINE | Facility: OTHER | Age: 64
End: 2019-03-09

## 2019-03-09 DIAGNOSIS — I10 BENIGN ESSENTIAL HYPERTENSION: ICD-10-CM

## 2019-03-09 DIAGNOSIS — K21.00 GASTROESOPHAGEAL REFLUX DISEASE WITH ESOPHAGITIS: ICD-10-CM

## 2019-03-11 NOTE — TELEPHONE ENCOUNTER
Omeprazole       Last Written Prescription Date:  2/11/19  Last Fill Quantity: 90,   # refills: 2  Last Office Visit: 12/18/18  Future Office visit:       Metoprolol Succinate ER      Last Written Prescription Date:  2/11/19  Last Fill Quantity: 90,   # refills: 2  Last Office Visit: 12/18/18  Future Office visit:      Patient requesting refills now go to [facility].   Refills pending with pharmacy selected.     Mayra Hough LPN

## 2019-03-12 RX ORDER — METOPROLOL SUCCINATE 25 MG/1
25 TABLET, EXTENDED RELEASE ORAL DAILY
Qty: 90 TABLET | Refills: 2 | Status: SHIPPED | OUTPATIENT
Start: 2019-03-12 | End: 2019-12-06

## 2019-03-25 ENCOUNTER — MYC REFILL (OUTPATIENT)
Dept: FAMILY MEDICINE | Facility: OTHER | Age: 64
End: 2019-03-25

## 2019-03-25 DIAGNOSIS — K21.00 GASTROESOPHAGEAL REFLUX DISEASE WITH ESOPHAGITIS: ICD-10-CM

## 2019-03-25 DIAGNOSIS — I10 BENIGN ESSENTIAL HYPERTENSION: ICD-10-CM

## 2019-03-26 ENCOUNTER — MYC REFILL (OUTPATIENT)
Dept: FAMILY MEDICINE | Facility: OTHER | Age: 64
End: 2019-03-26

## 2019-03-26 DIAGNOSIS — K21.00 GASTROESOPHAGEAL REFLUX DISEASE WITH ESOPHAGITIS: ICD-10-CM

## 2019-03-26 RX ORDER — METOPROLOL SUCCINATE 25 MG/1
25 TABLET, EXTENDED RELEASE ORAL DAILY
Qty: 90 TABLET | Refills: 2 | OUTPATIENT
Start: 2019-03-26

## 2019-12-06 ENCOUNTER — OFFICE VISIT (OUTPATIENT)
Dept: FAMILY MEDICINE | Facility: OTHER | Age: 64
End: 2019-12-06
Attending: FAMILY MEDICINE
Payer: MEDICARE

## 2019-12-06 VITALS
WEIGHT: 248 LBS | BODY MASS INDEX: 36.73 KG/M2 | RESPIRATION RATE: 18 BRPM | HEIGHT: 69 IN | SYSTOLIC BLOOD PRESSURE: 132 MMHG | TEMPERATURE: 96.9 F | OXYGEN SATURATION: 94 % | DIASTOLIC BLOOD PRESSURE: 82 MMHG | HEART RATE: 64 BPM

## 2019-12-06 DIAGNOSIS — K21.00 GASTROESOPHAGEAL REFLUX DISEASE WITH ESOPHAGITIS: ICD-10-CM

## 2019-12-06 DIAGNOSIS — Z23 IMMUNIZATION DUE: ICD-10-CM

## 2019-12-06 DIAGNOSIS — I10 BENIGN ESSENTIAL HYPERTENSION: ICD-10-CM

## 2019-12-06 DIAGNOSIS — Z12.5 SCREENING FOR PROSTATE CANCER: ICD-10-CM

## 2019-12-06 DIAGNOSIS — F41.0 PANIC DISORDER WITHOUT AGORAPHOBIA: ICD-10-CM

## 2019-12-06 DIAGNOSIS — I10 ESSENTIAL HYPERTENSION: Primary | ICD-10-CM

## 2019-12-06 LAB
ALBUMIN SERPL-MCNC: 4 G/DL (ref 3.4–5)
ALBUMIN UR-MCNC: NEGATIVE MG/DL
ALP SERPL-CCNC: 58 U/L (ref 40–150)
ALT SERPL W P-5'-P-CCNC: 38 U/L (ref 0–70)
ANION GAP SERPL CALCULATED.3IONS-SCNC: 5 MMOL/L (ref 3–14)
APPEARANCE UR: CLEAR
AST SERPL W P-5'-P-CCNC: 22 U/L (ref 0–45)
BACTERIA #/AREA URNS HPF: ABNORMAL /HPF
BASOPHILS # BLD AUTO: 0 10E9/L (ref 0–0.2)
BASOPHILS NFR BLD AUTO: 0.8 %
BILIRUB SERPL-MCNC: 0.4 MG/DL (ref 0.2–1.3)
BILIRUB UR QL STRIP: NEGATIVE
BUN SERPL-MCNC: 18 MG/DL (ref 7–30)
CALCIUM SERPL-MCNC: 9.1 MG/DL (ref 8.5–10.1)
CHLORIDE SERPL-SCNC: 106 MMOL/L (ref 94–109)
CHOLEST SERPL-MCNC: 182 MG/DL
CO2 SERPL-SCNC: 28 MMOL/L (ref 20–32)
COLOR UR AUTO: ABNORMAL
CREAT SERPL-MCNC: 1.02 MG/DL (ref 0.66–1.25)
DIFFERENTIAL METHOD BLD: NORMAL
EOSINOPHIL # BLD AUTO: 0.1 10E9/L (ref 0–0.7)
EOSINOPHIL NFR BLD AUTO: 2 %
ERYTHROCYTE [DISTWIDTH] IN BLOOD BY AUTOMATED COUNT: 12.3 % (ref 10–15)
GFR SERPL CREATININE-BSD FRML MDRD: 76 ML/MIN/{1.73_M2}
GLUCOSE SERPL-MCNC: 98 MG/DL (ref 70–99)
GLUCOSE UR STRIP-MCNC: NEGATIVE MG/DL
HCT VFR BLD AUTO: 46.2 % (ref 40–53)
HDLC SERPL-MCNC: 55 MG/DL
HGB BLD-MCNC: 16 G/DL (ref 13.3–17.7)
HGB UR QL STRIP: NEGATIVE
HYALINE CASTS #/AREA URNS LPF: 1 /LPF
IMM GRANULOCYTES # BLD: 0 10E9/L (ref 0–0.4)
IMM GRANULOCYTES NFR BLD: 0.2 %
KETONES UR STRIP-MCNC: NEGATIVE MG/DL
LDLC SERPL CALC-MCNC: 93 MG/DL
LEUKOCYTE ESTERASE UR QL STRIP: NEGATIVE
LYMPHOCYTES # BLD AUTO: 1.6 10E9/L (ref 0.8–5.3)
LYMPHOCYTES NFR BLD AUTO: 30.6 %
MCH RBC QN AUTO: 32.4 PG (ref 26.5–33)
MCHC RBC AUTO-ENTMCNC: 34.6 G/DL (ref 31.5–36.5)
MCV RBC AUTO: 94 FL (ref 78–100)
MONOCYTES # BLD AUTO: 0.6 10E9/L (ref 0–1.3)
MONOCYTES NFR BLD AUTO: 11.8 %
MUCOUS THREADS #/AREA URNS LPF: PRESENT /LPF
NEUTROPHILS # BLD AUTO: 2.8 10E9/L (ref 1.6–8.3)
NEUTROPHILS NFR BLD AUTO: 54.6 %
NITRATE UR QL: NEGATIVE
NONHDLC SERPL-MCNC: 127 MG/DL
NRBC # BLD AUTO: 0 10*3/UL
NRBC BLD AUTO-RTO: 0 /100
PH UR STRIP: 7 PH (ref 4.7–8)
PLATELET # BLD AUTO: 194 10E9/L (ref 150–450)
POTASSIUM SERPL-SCNC: 4.4 MMOL/L (ref 3.4–5.3)
PROT SERPL-MCNC: 7.5 G/DL (ref 6.8–8.8)
PSA SERPL-ACNC: 1.5 UG/L (ref 0–4)
RBC # BLD AUTO: 4.94 10E12/L (ref 4.4–5.9)
RBC #/AREA URNS AUTO: 0 /HPF (ref 0–2)
SODIUM SERPL-SCNC: 139 MMOL/L (ref 133–144)
SOURCE: ABNORMAL
SP GR UR STRIP: 1.02 (ref 1–1.03)
TRIGL SERPL-MCNC: 169 MG/DL
TSH SERPL DL<=0.005 MIU/L-ACNC: 2.58 MU/L (ref 0.4–4)
UROBILINOGEN UR STRIP-MCNC: NORMAL MG/DL (ref 0–2)
WBC # BLD AUTO: 5.1 10E9/L (ref 4–11)
WBC #/AREA URNS AUTO: <1 /HPF (ref 0–5)

## 2019-12-06 PROCEDURE — G0009 ADMIN PNEUMOCOCCAL VACCINE: HCPCS | Performed by: FAMILY MEDICINE

## 2019-12-06 PROCEDURE — 90714 TD VACC NO PRESV 7 YRS+ IM: CPT

## 2019-12-06 PROCEDURE — 99214 OFFICE O/P EST MOD 30 MIN: CPT | Performed by: FAMILY MEDICINE

## 2019-12-06 PROCEDURE — 80061 LIPID PANEL: CPT | Mod: ZL | Performed by: FAMILY MEDICINE

## 2019-12-06 PROCEDURE — 90732 PPSV23 VACC 2 YRS+ SUBQ/IM: CPT

## 2019-12-06 PROCEDURE — G0103 PSA SCREENING: HCPCS | Mod: ZL | Performed by: FAMILY MEDICINE

## 2019-12-06 PROCEDURE — 84443 ASSAY THYROID STIM HORMONE: CPT | Mod: ZL | Performed by: FAMILY MEDICINE

## 2019-12-06 PROCEDURE — G0463 HOSPITAL OUTPT CLINIC VISIT: HCPCS

## 2019-12-06 PROCEDURE — 85025 COMPLETE CBC W/AUTO DIFF WBC: CPT | Mod: ZL | Performed by: FAMILY MEDICINE

## 2019-12-06 PROCEDURE — 90472 IMMUNIZATION ADMIN EACH ADD: CPT | Performed by: FAMILY MEDICINE

## 2019-12-06 PROCEDURE — G0463 HOSPITAL OUTPT CLINIC VISIT: HCPCS | Mod: 25

## 2019-12-06 PROCEDURE — 81001 URINALYSIS AUTO W/SCOPE: CPT | Mod: ZL | Performed by: FAMILY MEDICINE

## 2019-12-06 PROCEDURE — 36415 COLL VENOUS BLD VENIPUNCTURE: CPT | Mod: ZL | Performed by: FAMILY MEDICINE

## 2019-12-06 PROCEDURE — 80053 COMPREHEN METABOLIC PANEL: CPT | Mod: ZL | Performed by: FAMILY MEDICINE

## 2019-12-06 RX ORDER — CITALOPRAM HYDROBROMIDE 20 MG/1
20 TABLET ORAL DAILY
Qty: 60 TABLET | Refills: 3 | Status: SHIPPED | OUTPATIENT
Start: 2019-12-06 | End: 2019-12-12

## 2019-12-06 ASSESSMENT — PAIN SCALES - GENERAL: PAINLEVEL: NO PAIN (0)

## 2019-12-06 ASSESSMENT — MIFFLIN-ST. JEOR: SCORE: 1892.36

## 2019-12-06 NOTE — NURSING NOTE
"Chief Complaint   Patient presents with     Hypertension     Gastrophageal Reflux       Initial /82 (BP Location: Right arm, Patient Position: Sitting, Cuff Size: Adult Regular)   Pulse 64   Temp 96.9  F (36.1  C) (Tympanic)   Resp 18   Ht 1.74 m (5' 8.5\")   Wt 112.5 kg (248 lb)   SpO2 94%   BMI 37.16 kg/m   Estimated body mass index is 37.16 kg/m  as calculated from the following:    Height as of this encounter: 1.74 m (5' 8.5\").    Weight as of this encounter: 112.5 kg (248 lb).  Medication Reconciliation: complete  Jennifer Cabrera LPN  "

## 2019-12-06 NOTE — PROGRESS NOTES
SUBJECTIVE:   Luis Perez is a 63 year old male who presents to clinic today for the following health issues:    Hypertension Follow-up      Outpatient blood pressures are not being checked.    Low Salt Diet: no added salt      Amount of exercise or physical activity: 6-7 days/week for an average of 15-30 minutes    Problems taking medications regularly: No    Medication side effects: none    Diet: regular (no restrictions)      GERD/Heartburn      Duration: ongoing concern    Description (location/character/radiation): on prescribed medication    Intensity:  mild    Accompanying signs and symptoms:  food getting stuck: no   nausea/vomiting/blood: no   abdominal pain: no   black/tarry or bloody stools: no :    History (similar episodes/previous evaluation): see medical record    Precipitating or alleviating factors:  worse with no particular food or drink.  current NSAID/Aspirin use: YES    Therapies tried and outcome: Omeprazole (Prilosec)    Abnormal Mood Symptoms      Duration: Months    Description:  Depression: no   Anxiety: YES  Panic attacks: YES     Accompanying signs and symptoms: see PHQ-9 and ELISABET scores    History (similar episodes/previous evaluation): None    Precipitating or alleviating factors: social situations  stress    Therapies tried and outcome: none       Problem list and histories reviewed & adjusted, as indicated.  Additional history: as documented    Patient Active Problem List   Diagnosis     Advanced care planning/counseling discussion     HTN (hypertension)     GERD     Comprehensive Medical Examination     Screening for prostate cancer     Special screening for malignant neoplasms, colon     Obesity (BMI 35.0-39.9) with comorbidity (H)     Past Surgical History:   Procedure Laterality Date     COLONOSCOPY  8-     COLONOSCOPY  03/15/2018    repeat colonoscopy in 5 years     UPPER GI ENDOSCOPY         Social History     Tobacco Use     Smoking status: Light Tobacco Smoker      "Years: 37.00     Types: Cigars     Smokeless tobacco: Never Used   Substance Use Topics     Alcohol use: Yes     Comment: 2 glasses wine daily     Family History   Problem Relation Age of Onset     Cancer Father         Non-Hodgkin's lymphoma - cause of death     Diabetes Mother      Hypertension Mother      Allergies Sister      Allergies Brother      Asthma Brother      Hypertension Brother      Heart Disease No family hx of      Thyroid Disease No family hx of      Cancer - colorectal No family hx of          Current Outpatient Medications   Medication Sig Dispense Refill     aspirin (ASPIRIN LOW DOSE) 81 MG EC tablet Take 1 tablet by mouth daily.       Flaxseed, Linseed, (FLAXSEED OIL PO) Take 1,400 mg by mouth daily       metoprolol succinate ER (TOPROL-XL) 25 MG 24 hr tablet Take 1 tablet (25 mg) by mouth daily 90 tablet 2     omeprazole (PRILOSEC) 20 MG DR capsule TAKE 1 CAPSULE DAILY BEFORE A MEAL 90 capsule 2     No Known Allergies  BP Readings from Last 3 Encounters:   12/06/19 132/82   12/18/18 140/88   12/15/17 126/78    Wt Readings from Last 3 Encounters:   12/06/19 112.5 kg (248 lb)   12/18/18 106.6 kg (235 lb)   12/15/17 106.6 kg (235 lb)                    Reviewed and updated as needed this visit by clinical staff  Tobacco  Allergies  Meds  Problems  Med Hx  Surg Hx  Fam Hx       Reviewed and updated as needed this visit by Provider         ROS:  Constitutional, HEENT, cardiovascular, pulmonary, gi and gu systems are negative, except as otherwise noted.    OBJECTIVE:     /82 (BP Location: Right arm, Patient Position: Sitting, Cuff Size: Adult Regular)   Pulse 64   Temp 96.9  F (36.1  C) (Tympanic)   Resp 18   Ht 1.74 m (5' 8.5\")   Wt 112.5 kg (248 lb)   SpO2 94%   BMI 37.16 kg/m    Body mass index is 37.16 kg/m .  Physical Exam   Constitutional: He is oriented to person, place, and time. He appears well-developed and well-nourished. No distress.   HENT:   Head: Normocephalic. "   Right Ear: Tympanic membrane, external ear and ear canal normal.   Left Ear: Tympanic membrane, external ear and ear canal normal.   Nose: Nose normal.   Mouth/Throat: Oropharynx is clear and moist.   Eyes: Conjunctivae are normal.   Neck: Neck supple. No JVD present. No thyromegaly present.   Cardiovascular: Normal rate, regular rhythm, normal heart sounds and intact distal pulses. Exam reveals no gallop and no friction rub.   No murmur heard.  Pulmonary/Chest: Effort normal and breath sounds normal. He has no rales.   Musculoskeletal:         General: No edema.   Neurological: He is alert and oriented to person, place, and time.   Skin: Skin is warm and dry.   Psychiatric: He has a normal mood and affect.         Diagnostic Test Results:  No results found for any visits on 12/06/19.    ASSESSMENT/PLAN:     Essential hypertension  Goals reviewed.  Continue home BP monitoring and same medication regimen.  Follow up 6 months.   - CBC with platelets differential  - Comprehensive metabolic panel  - Lipid Profile  - TSH with free T4 reflex  - UA with Microscopic reflex to Culture    Screening for prostate cancer  PSA drawn  - Prostate spec antigen screen    Panic disorder without agoraphobia  Begin citalopram (Celexa) once daily.  Follow up one month    Immunization due  Immunizations updated  - C TD PRSERV FREE >=7 YRS ADS IM  - PNEUMOCOCCAL VACCINE,ADULT,SQ OR IM  - VACCINE ADMINISTRATION, EACH ADDITIONAL  - VACCINE ADMINISTRATION, INITIAL    Kenneth Yuan MD  [facility] - [place]

## 2019-12-10 RX ORDER — METOPROLOL SUCCINATE 25 MG/1
TABLET, EXTENDED RELEASE ORAL
Qty: 90 TABLET | Refills: 3 | Status: SHIPPED | OUTPATIENT
Start: 2019-12-10 | End: 2020-12-08

## 2019-12-23 ENCOUNTER — MYC MEDICAL ADVICE (OUTPATIENT)
Dept: FAMILY MEDICINE | Facility: OTHER | Age: 64
End: 2019-12-23

## 2019-12-23 DIAGNOSIS — F41.0 PANIC DISORDER WITHOUT AGORAPHOBIA: Primary | ICD-10-CM

## 2019-12-31 NOTE — TELEPHONE ENCOUNTER
Please advise pt called again inquiring about his [employer] message. See [employer]. He would like a call back if possible today. Thank you

## 2020-02-04 NOTE — PROGRESS NOTES
Subjective     Luis Perez is a 64 year old male who presents to clinic today for the following health issues:    HPI   Hypertension Follow-up      Do you check your blood pressure regularly outside of the clinic? Yes     Are you following a low salt diet? No    Are your blood pressures ever more than 140 on the top number (systolic) OR more   than 90 on the bottom number (diastolic), for example 140/90? No      How many servings of fruits and vegetables do you eat daily?  2-3    On average, how many sweetened beverages do you drink each day (Examples: soda, juice, sweet tea, etc.  Do NOT count diet or artificially sweetened beverages)?   2    How many days per week do you exercise enough to make your heart beat faster? 3 or less    How many minutes a day do you exercise enough to make your heart beat faster? 20 - 29    How many days per week do you miss taking your medication? 0    Medication Followup of Celexa and zoloft    Taking Medication as prescribed: yes    Side Effects:  Slightly more ringing in ears    Medication Helping Symptoms:  tracey Smith was unable to start citalopram (Celexa) due to drug drug interactions. He was then started on sertraline (Zoloft). His symptoms are improved.       Patient Active Problem List   Diagnosis     Advanced care planning/counseling discussion     HTN (hypertension)     GERD     Comprehensive Medical Examination     Screening for prostate cancer     Special screening for malignant neoplasms, colon     Obesity (BMI 35.0-39.9) with comorbidity (H)     Past Surgical History:   Procedure Laterality Date     COLONOSCOPY  9-     COLONOSCOPY  03/15/2018    repeat colonoscopy in 5 years     UPPER GI ENDOSCOPY         Social History     Tobacco Use     Smoking status: Light Tobacco Smoker     Years: 37.00     Types: Cigars     Smokeless tobacco: Never Used   Substance Use Topics     Alcohol use: Yes     Comment: 2 glasses wine daily     Family History   Problem  "Relation Age of Onset     Cancer Father         Non-Hodgkin's lymphoma - cause of death     Diabetes Mother      Hypertension Mother      Allergies Sister      Allergies Brother      Asthma Brother      Hypertension Brother      Heart Disease No family hx of      Thyroid Disease No family hx of      Cancer - colorectal No family hx of          Current Outpatient Medications   Medication Sig Dispense Refill     aspirin (ASPIRIN LOW DOSE) 81 MG EC tablet Take 1 tablet by mouth daily.       Flaxseed, Linseed, (FLAXSEED OIL PO) Take 1,400 mg by mouth daily       metoprolol succinate ER (TOPROL-XL) 25 MG 24 hr tablet TAKE 1 TABLET BY MOUTH EVERY DAY 90 tablet 3     omeprazole (PRILOSEC) 20 MG DR capsule TAKE 1 CAPSULE BY MOUTH DAILY BEFORE A MEAL 90 capsule 3     sertraline (ZOLOFT) 50 MG tablet Take 1 tablet (50 mg) by mouth daily 60 tablet 1     No Known Allergies  BP Readings from Last 3 Encounters:   02/07/20 126/80   12/06/19 132/82   12/18/18 140/88    Wt Readings from Last 3 Encounters:   02/07/20 107.5 kg (237 lb)   12/06/19 112.5 kg (248 lb)   12/18/18 106.6 kg (235 lb)         Reviewed and updated as needed this visit by Provider         Review of Systems   ROS COMP: Constitutional, HEENT, cardiovascular, pulmonary, gi and gu systems are negative, except as otherwise noted.      Objective    /80 (BP Location: Right arm, Patient Position: Sitting, Cuff Size: Adult Regular)   Pulse 62   Temp 96.5  F (35.8  C) (Tympanic)   Resp 16   Ht 1.74 m (5' 8.5\")   Wt 107.5 kg (237 lb)   SpO2 94%   BMI 35.51 kg/m    Body mass index is 35.51 kg/m .  Physical Exam  Vitals signs and nursing note reviewed.   Constitutional:       Appearance: He is well-developed.   Neurological:      Mental Status: He is alert and oriented to person, place, and time.   Psychiatric:         Mood and Affect: Mood normal.         Behavior: Behavior normal.         Thought Content: Thought content normal.         Diagnostic Test " "Results:  Labs reviewed in Epic        Assessment & Plan   Panic disorder without agoraphobia  Improved.  Continue sertraline (Zoloft) as written.  Follow up 6 months.  - sertraline (ZOLOFT) 50 MG tablet; Take 1 tablet (50 mg) by mouth daily    Tobacco abuse  Encourage smoking cessation  - Tobacco Cessation - Order to Satisfy Health Maintenance    Tobacco abuse counseling  As above    Screening for AAA (abdominal aortic aneurysm)  Screening ordered  - [place] Aorta Medicare AAA Screening; Future    Encounter for hepatitis C screening test for low risk patient  Drawn  - Hepatitis C Screen Reflex to HCV RNA Quant and Genotype; Future    Screening for HIV (human immunodeficiency virus)  Drawn.   - HIV Antigen Antibody Combo; Future    Tobacco Cessation:   reports that he has been smoking cigars. He has smoked for the past 37.00 years. He has never used smokeless tobacco.  Tobacco Cessation Action Plan: Self help information given to patient      BMI:   Estimated body mass index is 35.51 kg/m  as calculated from the following:    Height as of this encounter: 1.74 m (5' 8.5\").    Weight as of this encounter: 107.5 kg (237 lb).   Weight management plan: Discussed healthy diet and exercise guidelines        See Patient Instructions    No follow-ups on file.    Kenneth Yuan MD  [facility] - [place]      "

## 2020-02-04 NOTE — PATIENT INSTRUCTIONS

## 2020-02-07 ENCOUNTER — OFFICE VISIT (OUTPATIENT)
Dept: FAMILY MEDICINE | Facility: OTHER | Age: 65
End: 2020-02-07
Attending: FAMILY MEDICINE
Payer: MEDICARE

## 2020-02-07 VITALS
TEMPERATURE: 96.5 F | RESPIRATION RATE: 16 BRPM | WEIGHT: 237 LBS | HEIGHT: 69 IN | BODY MASS INDEX: 35.1 KG/M2 | DIASTOLIC BLOOD PRESSURE: 80 MMHG | SYSTOLIC BLOOD PRESSURE: 126 MMHG | OXYGEN SATURATION: 94 % | HEART RATE: 62 BPM

## 2020-02-07 DIAGNOSIS — Z11.4 SCREENING FOR HIV (HUMAN IMMUNODEFICIENCY VIRUS): ICD-10-CM

## 2020-02-07 DIAGNOSIS — F41.0 PANIC DISORDER WITHOUT AGORAPHOBIA: ICD-10-CM

## 2020-02-07 DIAGNOSIS — Z13.6 SCREENING FOR AAA (ABDOMINAL AORTIC ANEURYSM): Primary | ICD-10-CM

## 2020-02-07 DIAGNOSIS — Z72.0 TOBACCO ABUSE: ICD-10-CM

## 2020-02-07 DIAGNOSIS — Z71.6 TOBACCO ABUSE COUNSELING: ICD-10-CM

## 2020-02-07 DIAGNOSIS — Z11.59 ENCOUNTER FOR HEPATITIS C SCREENING TEST FOR LOW RISK PATIENT: ICD-10-CM

## 2020-02-07 PROCEDURE — 99213 OFFICE O/P EST LOW 20 MIN: CPT | Performed by: FAMILY MEDICINE

## 2020-02-07 PROCEDURE — G0463 HOSPITAL OUTPT CLINIC VISIT: HCPCS

## 2020-02-07 ASSESSMENT — PAIN SCALES - GENERAL: PAINLEVEL: NO PAIN (0)

## 2020-02-07 ASSESSMENT — MIFFLIN-ST. JEOR: SCORE: 1842.46

## 2020-02-07 NOTE — NURSING NOTE
"Chief Complaint   Patient presents with     Recheck Medication     Hypertension       Initial /80 (BP Location: Right arm, Patient Position: Sitting, Cuff Size: Adult Regular)   Pulse 62   Temp 96.5  F (35.8  C) (Tympanic)   Resp 16   Ht 1.74 m (5' 8.5\")   Wt 107.5 kg (237 lb)   SpO2 94%   BMI 35.51 kg/m   Estimated body mass index is 35.51 kg/m  as calculated from the following:    Height as of this encounter: 1.74 m (5' 8.5\").    Weight as of this encounter: 107.5 kg (237 lb).  Medication Reconciliation: complete  Jennifer Cabrera LPN  "

## 2020-02-24 ENCOUNTER — HOSPITAL ENCOUNTER (OUTPATIENT)
Dept: ULTRASOUND IMAGING | Facility: HOSPITAL | Age: 65
Discharge: HOME | End: 2020-02-24
Attending: FAMILY MEDICINE | Admitting: FAMILY MEDICINE
Payer: MEDICARE

## 2020-02-24 DIAGNOSIS — Z13.6 SCREENING FOR AAA (ABDOMINAL AORTIC ANEURYSM): ICD-10-CM

## 2020-02-24 PROCEDURE — 76706 US ABDL AORTA SCREEN AAA: CPT | Mod: TC

## 2020-03-02 ENCOUNTER — HEALTH MAINTENANCE LETTER (OUTPATIENT)
Age: 65
End: 2020-03-02

## 2020-06-11 DIAGNOSIS — F41.0 PANIC DISORDER WITHOUT AGORAPHOBIA: ICD-10-CM

## 2020-06-11 NOTE — TELEPHONE ENCOUNTER
Zoloft      Last Written Prescription Date:  2-7-20  Last Fill Quantity: 60,   # refills: 1  Last Office Visit: 2-7-20  Future Office visit:

## 2020-08-10 DIAGNOSIS — F41.0 PANIC DISORDER WITHOUT AGORAPHOBIA: ICD-10-CM

## 2020-08-10 RX ORDER — SERTRALINE HYDROCHLORIDE 100 MG/1
TABLET, FILM COATED ORAL
Qty: 30 TABLET | Refills: 1 | Status: SHIPPED | OUTPATIENT
Start: 2020-08-10 | End: 2020-11-03

## 2020-08-10 NOTE — TELEPHONE ENCOUNTER
sertraline      Last Written Prescription Date:  6/11/20  Last Fill Quantity: 60,   # refills: 1  Last Office Visit: 2/7/20  Future Office visit:       Routing refill request to provider for review/approval because:

## 2020-09-08 ENCOUNTER — MYC MEDICAL ADVICE (OUTPATIENT)
Dept: FAMILY MEDICINE | Facility: OTHER | Age: 65
End: 2020-09-08

## 2020-09-08 DIAGNOSIS — I10 ESSENTIAL HYPERTENSION: Primary | ICD-10-CM

## 2020-09-19 RX ORDER — LISINOPRIL 10 MG/1
10 TABLET ORAL DAILY
Qty: 30 TABLET | Refills: 1 | Status: SHIPPED | OUTPATIENT
Start: 2020-09-19 | End: 2020-10-12

## 2020-10-07 ENCOUNTER — MYC MEDICAL ADVICE (OUTPATIENT)
Dept: FAMILY MEDICINE | Facility: OTHER | Age: 65
End: 2020-10-07

## 2020-10-07 DIAGNOSIS — R07.89 ATYPICAL CHEST PAIN: Primary | ICD-10-CM

## 2020-10-23 ENCOUNTER — HOSPITAL ENCOUNTER (OUTPATIENT)
Dept: NUCLEAR MEDICINE | Facility: HOSPITAL | Age: 65
Setting detail: NUCLEAR MEDICINE
End: 2020-10-23
Attending: FAMILY MEDICINE
Payer: MEDICARE

## 2020-10-23 ENCOUNTER — HOSPITAL ENCOUNTER (OUTPATIENT)
Dept: CARDIOLOGY | Facility: HOSPITAL | Age: 65
Setting detail: NUCLEAR MEDICINE
End: 2020-10-23
Attending: FAMILY MEDICINE
Payer: MEDICARE

## 2020-10-23 DIAGNOSIS — R07.89 ATYPICAL CHEST PAIN: ICD-10-CM

## 2020-10-23 PROCEDURE — A9500 TC99M SESTAMIBI: HCPCS | Performed by: RADIOLOGY

## 2020-10-23 PROCEDURE — 78452 HT MUSCLE IMAGE SPECT MULT: CPT

## 2020-10-23 PROCEDURE — 343N000001 HC RX 343: Performed by: RADIOLOGY

## 2020-10-23 PROCEDURE — 93018 CV STRESS TEST I&R ONLY: CPT | Performed by: INTERNAL MEDICINE

## 2020-10-23 PROCEDURE — 93017 CV STRESS TEST TRACING ONLY: CPT | Performed by: INTERNAL MEDICINE

## 2020-10-23 PROCEDURE — 250N000011 HC RX IP 250 OP 636: Performed by: INTERNAL MEDICINE

## 2020-10-23 PROCEDURE — 93016 CV STRESS TEST SUPVJ ONLY: CPT | Performed by: INTERNAL MEDICINE

## 2020-10-23 RX ORDER — AMINOPHYLLINE 25 MG/ML
INJECTION, SOLUTION INTRAVENOUS
Status: DISCONTINUED
Start: 2020-10-23 | End: 2020-10-23 | Stop reason: WASHOUT

## 2020-10-23 RX ORDER — REGADENOSON 0.08 MG/ML
0.4 INJECTION, SOLUTION INTRAVENOUS ONCE
Status: COMPLETED | OUTPATIENT
Start: 2020-10-23 | End: 2020-10-23

## 2020-10-23 RX ADMIN — Medication 31.6 MILLICURIE: at 09:00

## 2020-10-23 RX ADMIN — REGADENOSON 0.4 MG: 0.08 INJECTION, SOLUTION INTRAVENOUS at 09:00

## 2020-10-23 RX ADMIN — Medication 10.2 MILLICURIE: at 07:09

## 2020-10-25 LAB
CV BLOOD PRESSURE: 77 %
CV STRESS MAX HR HE: 97
NUC STRESS EJECTION FRACTION: 73 %
RATE PRESSURE PRODUCT: 8342
STRESS ECHO BASELINE DIASTOLIC HE: 80
STRESS ECHO BASELINE HR: 63
STRESS ECHO BASELINE SYSTOLIC BP: 120
STRESS ECHO CALCULATED PERCENT HR: 63 %
STRESS ECHO LAST STRESS DIASTOLIC BP: 60
STRESS ECHO LAST STRESS SYSTOLIC BP: 86
STRESS ECHO TARGET HR: 154

## 2020-10-27 ENCOUNTER — TELEPHONE (OUTPATIENT)
Dept: CARDIOLOGY | Facility: OTHER | Age: 65
End: 2020-10-27

## 2020-10-27 DIAGNOSIS — R06.09 DYSPNEA ON EXERTION: Primary | ICD-10-CM

## 2020-10-27 NOTE — TELEPHONE ENCOUNTER
Pt returning call and he was told he needs an appt for a cardiology consult.    Please call pt back at [phone]      Idalia Barger RN

## 2020-10-28 NOTE — TELEPHONE ENCOUNTER
Pt calling on the status of below.  Please call back [phone].      Patient calling and is wondering what is abnormal on his stress test? He is curious.      Idalia Barger RN

## 2020-11-02 DIAGNOSIS — F41.0 PANIC DISORDER WITHOUT AGORAPHOBIA: ICD-10-CM

## 2020-11-03 RX ORDER — SERTRALINE HYDROCHLORIDE 100 MG/1
TABLET, FILM COATED ORAL
Qty: 45 TABLET | Refills: 1 | Status: SHIPPED | OUTPATIENT
Start: 2020-11-03 | End: 2020-11-18

## 2020-11-03 NOTE — TELEPHONE ENCOUNTER
zoloft      Last Written Prescription Date:  8/10/2020  Last Fill Quantity: 30,   # refills: 1  Last Office Visit: 2/7/2020  Future Office visit:

## 2020-11-05 DIAGNOSIS — I10 ESSENTIAL HYPERTENSION: ICD-10-CM

## 2020-11-05 NOTE — TELEPHONE ENCOUNTER
lisinopril      Last Written Prescription Date:  10/12/20  Last Fill Quantity: 30,   # refills: 0  Last Office Visit: 02/07/20

## 2020-11-06 RX ORDER — LISINOPRIL 10 MG/1
TABLET ORAL
Qty: 30 TABLET | Refills: 0 | Status: SHIPPED | OUTPATIENT
Start: 2020-11-06 | End: 2020-12-01

## 2020-11-12 DIAGNOSIS — R06.09 DYSPNEA ON EXERTION: Primary | ICD-10-CM

## 2020-11-18 ENCOUNTER — OFFICE VISIT (OUTPATIENT)
Dept: CARDIOLOGY | Facility: OTHER | Age: 65
End: 2020-11-18
Attending: FAMILY MEDICINE
Payer: MEDICARE

## 2020-11-18 ENCOUNTER — APPOINTMENT (OUTPATIENT)
Dept: GENERAL RADIOLOGY | Facility: OTHER | Age: 65
End: 2020-11-18
Attending: FAMILY MEDICINE
Payer: MEDICARE

## 2020-11-18 VITALS
HEART RATE: 69 BPM | DIASTOLIC BLOOD PRESSURE: 88 MMHG | WEIGHT: 238 LBS | TEMPERATURE: 97.5 F | SYSTOLIC BLOOD PRESSURE: 137 MMHG | BODY MASS INDEX: 35.66 KG/M2 | OXYGEN SATURATION: 95 %

## 2020-11-18 DIAGNOSIS — E78.1 HYPERTRIGLYCERIDEMIA: ICD-10-CM

## 2020-11-18 DIAGNOSIS — R61 DIAPHORESIS: Primary | ICD-10-CM

## 2020-11-18 DIAGNOSIS — F40.10 SOCIAL ANXIETY DISORDER: ICD-10-CM

## 2020-11-18 DIAGNOSIS — K21.9 GASTROESOPHAGEAL REFLUX DISEASE WITHOUT ESOPHAGITIS: ICD-10-CM

## 2020-11-18 DIAGNOSIS — E66.01 MORBID OBESITY (H): ICD-10-CM

## 2020-11-18 DIAGNOSIS — I10 ESSENTIAL HYPERTENSION: ICD-10-CM

## 2020-11-18 DIAGNOSIS — R06.09 DYSPNEA ON EXERTION: ICD-10-CM

## 2020-11-18 DIAGNOSIS — M54.2 NECK PAIN: ICD-10-CM

## 2020-11-18 DIAGNOSIS — F17.290 CIGAR SMOKER: ICD-10-CM

## 2020-11-18 PROCEDURE — G0463 HOSPITAL OUTPT CLINIC VISIT: HCPCS

## 2020-11-18 PROCEDURE — 99204 OFFICE O/P NEW MOD 45 MIN: CPT | Performed by: INTERNAL MEDICINE

## 2020-11-18 PROCEDURE — 93010 ELECTROCARDIOGRAM REPORT: CPT | Performed by: INTERNAL MEDICINE

## 2020-11-18 PROCEDURE — G0463 HOSPITAL OUTPT CLINIC VISIT: HCPCS | Mod: 25

## 2020-11-18 PROCEDURE — 93005 ELECTROCARDIOGRAM TRACING: CPT

## 2020-11-18 RX ORDER — SERTRALINE HYDROCHLORIDE 25 MG/1
25 TABLET, FILM COATED ORAL DAILY
Qty: 90 TABLET | Refills: 3 | Status: SHIPPED | OUTPATIENT
Start: 2020-11-18 | End: 2021-11-08

## 2020-11-18 RX ORDER — INFLUENZA A VIRUS A/MICHIGAN/45/2015 X-275 (H1N1) ANTIGEN (FORMALDEHYDE INACTIVATED), INFLUENZA A VIRUS A/SINGAPORE/INFIMH-16-0019/2016 IVR-186 (H3N2) ANTIGEN (FORMALDEHYDE INACTIVATED), INFLUENZA B VIRUS B/PHUKET/3073/2013 ANTIGEN (FORMALDEHYDE INACTIVATED), AND INFLUENZA B VIRUS B/MARYLAND/15/2016 BX-69A ANTIGEN (FORMALDEHYDE INACTIVATED) 60; 60; 60; 60 UG/.7ML; UG/.7ML; UG/.7ML; UG/.7ML
INJECTION, SUSPENSION INTRAMUSCULAR
COMMUNITY
Start: 2020-09-05

## 2020-11-18 RX ORDER — ZOSTER VACCINE RECOMBINANT, ADJUVANTED 50 MCG/0.5
KIT INTRAMUSCULAR
COMMUNITY
Start: 2020-11-08

## 2020-11-18 ASSESSMENT — PAIN SCALES - GENERAL: PAINLEVEL: NO PAIN (0)

## 2020-11-18 NOTE — PATIENT INSTRUCTIONS
Thank you for allowing Dr. Willams and our  team to participate in your care. Please call our office at [phone] with scheduling questions or if you need to cancel or change your appointment. With any other questions or concerns you may call Leanne cardiology nurse at [phone].       If you experience chest pain, chest pressure, chest tightness, shortness of breath, fainting, lightheadedness, nausea, vomiting, or other concerning symptoms, please report to the Emergency Department or call 911. These symptoms may be emergent, and best treated in the Emergency Department.    Follow up in

## 2020-11-18 NOTE — NURSING NOTE
"Chief Complaint   Patient presents with     Consult       Initial /88 (BP Location: Left arm, Patient Position: Sitting, Cuff Size: Adult Large)   Pulse 69   Temp 97.5  F (36.4  C) (Tympanic)   Wt 108 kg (238 lb)   SpO2 95%   BMI 35.66 kg/m   Estimated body mass index is 35.66 kg/m  as calculated from the following:    Height as of 2/7/20: 1.74 m (5' 8.5\").    Weight as of this encounter: 108 kg (238 lb).  Medication Reconciliation: complete  Leanne Wynn LPN    "

## 2020-11-18 NOTE — PROGRESS NOTES
[facility] HEART CARE   CARDIOLOGY CONSULT     Luis Perez   1955  [id number]    Kenneth Yuan     Chief Complaint   Patient presents with     Consult          HPI:   Mr. Perez is a 65-year-old gentleman who is being seen by cardiology for diaphoresis and left sided neck pain.  He is here in follow-up to his stress test.  He also has a history of hypertension, social anxiety, obesity, GERD, hypertriglyceridemia, and occasional cigar smoker.    Reportedly, he has been having intermittent episodes of diaphoresis that typically involve his head.  He states he gets clammy lasting a few minutes.  At his peak, he had a couple of episodes a week.  More frequently, they happen in the evening.  More recently, he has not had an episode for the last month or 2.  The diaphoresis started when taking Celexa for social anxiety disorder and then transitioning to Zoloft.  He has had some random episodes of left-sided neck pain but has correlated to elevated blood pressures.  His pressures have improved on lisinopril 10 mg daily and his neck pain has since resolved.  He is rather active outside working on his property.  He has a hill that he walks up and down.  He does shovel and go his lawn.  He has never endorsed chest pain, chest tightness, or chest discomfort.  He does not have a history of heart disease but to include stenting or bypass.  He has never had a heart attack his neck pain is random and not activity driven.  He does smoke an occasional cigar while working outside such as shoveling.  He does not have a history of heart disease.  There is no significant family history of heart disease.  Denies diabetes, hyperlipidemia, sedentary lifestyle, or poor diet.  His risk factors are limited.    I reviewed his stress test.  Overall, I believe the stress test is normal from 10/23/2020.  I do not believe his neck pain and diaphoresis is related to his heart.    IMAGING RESULTS:   Stress test on 10/23/2020:     The nuclear  stress test is abnormal.     There is a small area of mild ischemia in the lateral segment(s) of the left ventricle.     The left ventricular ejection fraction at rest is 77%.  The left ventricular ejection fraction at stress is 73%.     There is no prior study for comparison.    Ultrasound abdomen for AAA on 2/24/2020:   Mild ectasia of the distal abdominal aorta. No evidence of aneurysm.    CURRENT MEDICATIONS:   Prior to Admission medications    Medication Sig Start Date End Date Taking? Authorizing Provider   aspirin (ASPIRIN LOW DOSE) 81 MG EC tablet Take 1 tablet by mouth daily.    Reported, Patient   Flaxseed, Linseed, (FLAXSEED OIL PO) Take 1,400 mg by mouth daily    Reported, Patient   lisinopril (ZESTRIL) 10 MG tablet TAKE 1 TABLET BY MOUTH EVERY DAY 11/6/20   Kenneth Yuan MD   metoprolol succinate ER (TOPROL-XL) 25 MG 24 hr tablet TAKE 1 TABLET BY MOUTH EVERY DAY 12/10/19   Kenneth Yuan MD   omeprazole (PRILOSEC) 20 MG DR capsule TAKE 1 CAPSULE BY MOUTH DAILY BEFORE A MEAL 12/10/19   Kenneth Yuan MD   sertraline (ZOLOFT) 100 MG tablet TAKE HALF A TABLET (50 MG) BY MOUTH EVERY DAY 11/3/20   Kenneth Yuan MD       ALLERGIES:   No Known Allergies     PAST MEDICAL HISTORY:   Past Medical History:   Diagnosis Date     GERD 2/22/2000     Hypertension 6/3/2005        PAST SURGICAL HISTORY:   Past Surgical History:   Procedure Laterality Date     COLONOSCOPY  9-     COLONOSCOPY  03/15/2018    repeat colonoscopy in 5 years     UPPER GI ENDOSCOPY          FAMILY HISTORY:   Family History   Problem Relation Age of Onset     Cancer Father         Non-Hodgkin's lymphoma - cause of death     Diabetes Mother      Hypertension Mother      Allergies Sister      Allergies Brother      Asthma Brother      Hypertension Brother      Heart Disease No family hx of      Thyroid Disease No family hx of      Cancer - colorectal No family hx of         SOCIAL HISTORY:   Social History     Socioeconomic  History     Marital status:      Spouse name: Not on file     Number of children: Not on file     Years of education: Not on file     Highest education level: Not on file   Occupational History     Employer: RETIRED   Social Needs     Financial resource strain: Not on file     Food insecurity     Worry: Not on file     Inability: Not on file     Transportation needs     Medical: Not on file     Non-medical: Not on file   Tobacco Use     Smoking status: Light Tobacco Smoker     Years: 37.00     Types: Cigars     Smokeless tobacco: Never Used   Substance and Sexual Activity     Alcohol use: Yes     Comment: 2 glasses wine daily     Drug use: No     Sexual activity: Yes     Partners: Female   Lifestyle     Physical activity     Days per week: Not on file     Minutes per session: Not on file     Stress: Not on file   Relationships     Social connections     Talks on phone: Not on file     Gets together: Not on file     Attends [employer] service: Not on file     Active member of club or organization: Not on file     Attends meetings of clubs or organizations: Not on file     Relationship status: Not on file     Intimate partner violence     Fear of current or ex partner: Not on file     Emotionally abused: Not on file     Physically abused: Not on file     Forced sexual activity: Not on file   Other Topics Concern      Service No     Blood Transfusions Yes     Comment: Permits if needed     Caffeine Concern Yes     Comment: 3 cups coffee, soda daily     Occupational Exposure No     Hobby Hazards No     Sleep Concern Yes     Stress Concern No     Weight Concern No     Special Diet No     Back Care No     Exercise No     Bike Helmet Not Asked     Seat Belt Yes     Self-Exams Not Asked     Parent/sibling w/ CABG, MI or angioplasty before 65F 55M? No   Social History Narrative     Not on file          ROS:   CONSTITUTIONAL: No weight loss, fever, chills, weakness or fatigue.   HEENT: Eyes: No visual changes.  Ears, Nose, Throat: No hearing loss, congestion or difficulty swallowing.  He does endorse occasional neck discomfort.  CARDIOVASCULAR: No chest pain, chest pressure or chest discomfort. No palpitations or lower extremity edema.   RESPIRATORY: No shortness of breath, dyspnea upon exertion, cough or sputum production.   GASTROINTESTINAL: No abdominal pain. No anorexia, nausea, vomiting or diarrhea.   NEUROLOGICAL: No headache, lightheadedness, dizziness, syncope, ataxia or weakness.   HEMATOLOGIC: No anemia, bleeding or bruising.   PSYCHIATRIC: No history of depression or anxiety.   ENDOCRINOLOGIC: No reports of cold or heat intolerance. No polyuria or polydipsia.  He endorses occasional episodes of diaphoresis.  SKIN: No abnormal rashes or itching.       PHYSICAL EXAM:   GENERAL: The patient is a well-developed, well-nourished, in no apparent distress. Alert and oriented x3.   HEENT: Head is normocephalic and atraumatic. Eyes are symmetrical with normal visual tracking.  HEART: Regular rate and rhythm, S1S2 present without murmur, rub or gallop.   LUNGS: Respirations regular and unlabored. Clear to auscultation.   GI: Abdomen is soft and nondistended.   EXTREMITIES: No peripheral edema present.   MUSCULOSKELETAL: No joint swelling.   NEUROLOGIC: Alert and oriented X3.    SKIN: No jaundice. No rashes or visible skin lesions present.        LAB RESULTS:   No visits with results within 2 Month(s) from this visit.   Latest known visit with results is:   Office Visit on 12/06/2019   Component Date Value Ref Range Status     WBC 12/06/2019 5.1  4.0 - 11.0 10e9/L Final     RBC Count 12/06/2019 4.94  4.4 - 5.9 10e12/L Final     Hemoglobin 12/06/2019 16.0  13.3 - 17.7 g/dL Final     Hematocrit 12/06/2019 46.2  40.0 - 53.0 % Final     MCV 12/06/2019 94  78 - 100 fl Final     MCH 12/06/2019 32.4  26.5 - 33.0 pg Final     MCHC 12/06/2019 34.6  31.5 - 36.5 g/dL Final     RDW 12/06/2019 12.3  10.0 - 15.0 % Final     Platelet  Count 12/06/2019 194  150 - 450 10e9/L Final     Diff Method 12/06/2019 Automated Method   Final     % Neutrophils 12/06/2019 54.6  % Final     % Lymphocytes 12/06/2019 30.6  % Final     % Monocytes 12/06/2019 11.8  % Final     % Eosinophils 12/06/2019 2.0  % Final     % Basophils 12/06/2019 0.8  % Final     % Immature Granulocytes 12/06/2019 0.2  % Final     Nucleated RBCs 12/06/2019 0  0 /100 Final     Absolute Neutrophil 12/06/2019 2.8  1.6 - 8.3 10e9/L Final     Absolute Lymphocytes 12/06/2019 1.6  0.8 - 5.3 10e9/L Final     Absolute Monocytes 12/06/2019 0.6  0.0 - 1.3 10e9/L Final     Absolute Eosinophils 12/06/2019 0.1  0.0 - 0.7 10e9/L Final     Absolute Basophils 12/06/2019 0.0  0.0 - 0.2 10e9/L Final     Abs Immature Granulocytes 12/06/2019 0.0  0 - 0.4 10e9/L Final     Absolute Nucleated RBC 12/06/2019 0.0   Final     Sodium 12/06/2019 139  133 - 144 mmol/L Final     Potassium 12/06/2019 4.4  3.4 - 5.3 mmol/L Final     Chloride 12/06/2019 106  94 - 109 mmol/L Final     Carbon Dioxide 12/06/2019 28  20 - 32 mmol/L Final     Anion Gap 12/06/2019 5  3 - 14 mmol/L Final     Glucose 12/06/2019 98  70 - 99 mg/dL Final     Urea Nitrogen 12/06/2019 18  7 - 30 mg/dL Final     Creatinine 12/06/2019 1.02  0.66 - 1.25 mg/dL Final     GFR Estimate 12/06/2019 76  >60 mL/min/[1.73_m2] Final     GFR Estimate If Black 12/06/2019 88  >60 mL/min/[1.73_m2] Final     Calcium 12/06/2019 9.1  8.5 - 10.1 mg/dL Final     Bilirubin Total 12/06/2019 0.4  0.2 - 1.3 mg/dL Final     Albumin 12/06/2019 4.0  3.4 - 5.0 g/dL Final     Protein Total 12/06/2019 7.5  6.8 - 8.8 g/dL Final     Alkaline Phosphatase 12/06/2019 58  40 - 150 U/L Final     ALT 12/06/2019 38  0 - 70 U/L Final     AST 12/06/2019 22  0 - 45 U/L Final     Cholesterol 12/06/2019 182  <200 mg/dL Final     Triglycerides 12/06/2019 169* <150 mg/dL Final     HDL Cholesterol 12/06/2019 55  >39 mg/dL Final     LDL Cholesterol Calculated 12/06/2019 93  <100 mg/dL Final     Non  HDL Cholesterol 12/06/2019 127  <130 mg/dL Final     TSH 12/06/2019 2.58  0.40 - 4.00 mU/L Final     Color Urine 12/06/2019 Light Yellow   Final     Appearance Urine 12/06/2019 Clear   Final     Glucose Urine 12/06/2019 Negative  NEG^Negative mg/dL Final     Bilirubin Urine 12/06/2019 Negative  NEG^Negative Final     Ketones Urine 12/06/2019 Negative  NEG^Negative mg/dL Final     Specific Gravity Urine 12/06/2019 1.022  1.003 - 1.035 Final     Blood Urine 12/06/2019 Negative  NEG^Negative Final     pH Urine 12/06/2019 7.0  4.7 - 8.0 pH Final     Protein Albumin Urine 12/06/2019 Negative  NEG^Negative mg/dL Final     Urobilinogen mg/dL 12/06/2019 Normal  0.0 - 2.0 mg/dL Final     Nitrite Urine 12/06/2019 Negative  NEG^Negative Final     Leukocyte Esterase Urine 12/06/2019 Negative  NEG^Negative Final     Source 12/06/2019 Midstream Urine   Final     WBC Urine 12/06/2019 <1  0 - 5 /HPF Final     RBC Urine 12/06/2019 0  0 - 2 /HPF Final     Bacteria Urine 12/06/2019 None* NEG^Negative /HPF Final     Mucous Urine 12/06/2019 Present* NEG^Negative /LPF Final     Hyaline Casts 12/06/2019 1* OTO2^O - 2 /LPF Final     PSA 12/06/2019 1.50  0 - 4 ug/L Final          ASSESSMENT:       ICD-10-CM    1. Diaphoresis  R61    2. Dyspnea on exertion  R06.00    3. Neck pain  M54.2    4. Essential hypertension  I10    5. Social anxiety disorder  F40.10 sertraline (ZOLOFT) 25 MG tablet   6. Obesity (BMI 35.0-39.9) with comorbidity (H)  E66.01    7. Gastroesophageal reflux disease without esophagitis  K21.9    8. Hypertriglyceridemia  E78.1    9. Cigar smoker  F17.290          PLAN:   1.  I do not believe his diaphoresis and neck pain are related to his heart.  I believe his stress test is normal.  He does not have typical anginal symptoms nor does he have any significant risk factors.  I am concerned his diaphoresis is potentially related to his SSRI.  Originally, on Celexa but then transitioning to Zoloft.  He is currently on Zoloft 50  mg daily.  I recommended he take 25 mg daily to see if this improves his symptoms.  If it does not, certainly, he can try discontinuing Zoloft for 1 to 2 weeks to see if his diaphoresis resolved.  If it does, he will have 2 decide at that time whether the diaphoresis outweighs the benefit of reduction in social anxiety.  2.  EKG today is unremarkable.  2.  Follow-up in the future on as-needed basis.    Thank you for allowing me to participate in the care of your patient. Please do not hesitate to contact me if you have any questions.     John Willams, DO

## 2020-11-30 DIAGNOSIS — I10 ESSENTIAL HYPERTENSION: ICD-10-CM

## 2020-11-30 NOTE — TELEPHONE ENCOUNTER
Lisinopril 10 mg      Last Written Prescription Date:  11/06/2020  Last Fill Quantity: 30,   # refills: 0  Last Office Visit: 02/07/2020  Future Office visit:

## 2020-12-01 RX ORDER — LISINOPRIL 10 MG/1
TABLET ORAL
Qty: 30 TABLET | Refills: 0 | Status: SHIPPED | OUTPATIENT
Start: 2020-12-01 | End: 2020-12-02

## 2020-12-02 DIAGNOSIS — I10 ESSENTIAL HYPERTENSION: ICD-10-CM

## 2020-12-03 RX ORDER — LISINOPRIL 10 MG/1
10 TABLET ORAL DAILY
Qty: 90 TABLET | Refills: 0 | Status: SHIPPED | OUTPATIENT
Start: 2020-12-03 | End: 2020-12-24

## 2020-12-04 ENCOUNTER — MYC MEDICAL ADVICE (OUTPATIENT)
Dept: FAMILY MEDICINE | Facility: OTHER | Age: 65
End: 2020-12-04

## 2020-12-08 DIAGNOSIS — K21.00 GASTROESOPHAGEAL REFLUX DISEASE WITH ESOPHAGITIS WITHOUT HEMORRHAGE: Primary | ICD-10-CM

## 2020-12-08 DIAGNOSIS — K21.00 GASTROESOPHAGEAL REFLUX DISEASE WITH ESOPHAGITIS: ICD-10-CM

## 2020-12-08 DIAGNOSIS — I10 BENIGN ESSENTIAL HYPERTENSION: ICD-10-CM

## 2020-12-08 RX ORDER — METOPROLOL SUCCINATE 25 MG/1
25 TABLET, EXTENDED RELEASE ORAL DAILY
Qty: 90 TABLET | Refills: 3 | Status: SHIPPED | OUTPATIENT
Start: 2020-12-08 | End: 2021-11-23

## 2020-12-08 NOTE — TELEPHONE ENCOUNTER
omeprazole (PRILOSEC) 20 MG DR capsule      Last Written Prescription Date:  12/10/19  Last Fill Quantity: 90,   # refills: 3  Last Office Visit: 2/7/20  Future Office visit:       Routing refill request to provider for review/approval because:  Drug not on the [facility], [facility] or  Health refill protocol or controlled substance      metoprolol succinate ER (TOPROL-XL) 25 MG 24 hr tablet      Last Written Prescription Date:  12/10/19  Last Fill Quantity: 90,   # refills: 3  Last Office Visit: 2/7/20  Future Office visit:       Routing refill request to provider for review/approval because:  Drug not on the [facility], P or  Health refill protocol or controlled substance

## 2020-12-24 DIAGNOSIS — I10 ESSENTIAL HYPERTENSION: ICD-10-CM

## 2020-12-24 RX ORDER — LISINOPRIL 10 MG/1
TABLET ORAL
Qty: 90 TABLET | Refills: 1 | Status: SHIPPED | OUTPATIENT
Start: 2020-12-24 | End: 2021-08-10

## 2020-12-24 NOTE — TELEPHONE ENCOUNTER
Zestril  10mg      Last Written Prescription Date:  12/3/20  Last Fill Quantity: 90,   # refills: 0  Last Office Visit: 2/7/20  Future Office visit:       Routing refill request to provider for review/approval because:

## 2021-04-18 ENCOUNTER — HEALTH MAINTENANCE LETTER (OUTPATIENT)
Age: 66
End: 2021-04-18

## 2021-05-07 ENCOUNTER — OFFICE VISIT (OUTPATIENT)
Dept: FAMILY MEDICINE | Facility: OTHER | Age: 66
End: 2021-05-07
Attending: FAMILY MEDICINE
Payer: MEDICARE

## 2021-05-07 VITALS
TEMPERATURE: 96.5 F | OXYGEN SATURATION: 95 % | DIASTOLIC BLOOD PRESSURE: 78 MMHG | BODY MASS INDEX: 36.29 KG/M2 | WEIGHT: 242.2 LBS | RESPIRATION RATE: 16 BRPM | SYSTOLIC BLOOD PRESSURE: 124 MMHG | HEART RATE: 75 BPM

## 2021-05-07 DIAGNOSIS — I10 ESSENTIAL HYPERTENSION: Primary | ICD-10-CM

## 2021-05-07 DIAGNOSIS — K21.00 GASTROESOPHAGEAL REFLUX DISEASE WITH ESOPHAGITIS WITHOUT HEMORRHAGE: ICD-10-CM

## 2021-05-07 DIAGNOSIS — Z12.5 SCREENING PSA (PROSTATE SPECIFIC ANTIGEN): ICD-10-CM

## 2021-05-07 DIAGNOSIS — F41.0 PANIC DISORDER WITHOUT AGORAPHOBIA: ICD-10-CM

## 2021-05-07 LAB
ALBUMIN SERPL-MCNC: 3.9 G/DL (ref 3.4–5)
ALP SERPL-CCNC: 64 U/L (ref 40–150)
ALT SERPL W P-5'-P-CCNC: 39 U/L (ref 0–70)
ANION GAP SERPL CALCULATED.3IONS-SCNC: 3 MMOL/L (ref 3–14)
AST SERPL W P-5'-P-CCNC: 28 U/L (ref 0–45)
BASOPHILS # BLD AUTO: 0.1 10E9/L (ref 0–0.2)
BASOPHILS NFR BLD AUTO: 1 %
BILIRUB SERPL-MCNC: 0.3 MG/DL (ref 0.2–1.3)
BUN SERPL-MCNC: 17 MG/DL (ref 7–30)
CALCIUM SERPL-MCNC: 9.2 MG/DL (ref 8.5–10.1)
CHLORIDE SERPL-SCNC: 104 MMOL/L (ref 94–109)
CHOLEST SERPL-MCNC: 193 MG/DL
CO2 SERPL-SCNC: 30 MMOL/L (ref 20–32)
CREAT SERPL-MCNC: 0.98 MG/DL (ref 0.66–1.25)
DIFFERENTIAL METHOD BLD: NORMAL
EOSINOPHIL # BLD AUTO: 0.1 10E9/L (ref 0–0.7)
EOSINOPHIL NFR BLD AUTO: 2.7 %
ERYTHROCYTE [DISTWIDTH] IN BLOOD BY AUTOMATED COUNT: 13.3 % (ref 10–15)
GFR SERPL CREATININE-BSD FRML MDRD: 79 ML/MIN/{1.73_M2}
GLUCOSE SERPL-MCNC: 101 MG/DL (ref 70–99)
HCT VFR BLD AUTO: 45.3 % (ref 40–53)
HDLC SERPL-MCNC: 59 MG/DL
HGB BLD-MCNC: 15.4 G/DL (ref 13.3–17.7)
IMM GRANULOCYTES # BLD: 0 10E9/L (ref 0–0.4)
IMM GRANULOCYTES NFR BLD: 0.4 %
LDLC SERPL CALC-MCNC: 104 MG/DL
LYMPHOCYTES # BLD AUTO: 1.5 10E9/L (ref 0.8–5.3)
LYMPHOCYTES NFR BLD AUTO: 31.1 %
MCH RBC QN AUTO: 32.5 PG (ref 26.5–33)
MCHC RBC AUTO-ENTMCNC: 34 G/DL (ref 31.5–36.5)
MCV RBC AUTO: 96 FL (ref 78–100)
MONOCYTES # BLD AUTO: 0.5 10E9/L (ref 0–1.3)
MONOCYTES NFR BLD AUTO: 10.6 %
NEUTROPHILS # BLD AUTO: 2.7 10E9/L (ref 1.6–8.3)
NEUTROPHILS NFR BLD AUTO: 54.2 %
NONHDLC SERPL-MCNC: 134 MG/DL
NRBC # BLD AUTO: 0 10*3/UL
NRBC BLD AUTO-RTO: 0 /100
PLATELET # BLD AUTO: 196 10E9/L (ref 150–450)
POTASSIUM SERPL-SCNC: 4.6 MMOL/L (ref 3.4–5.3)
PROT SERPL-MCNC: 7.6 G/DL (ref 6.8–8.8)
PSA SERPL-ACNC: 1.52 UG/L (ref 0–4)
RBC # BLD AUTO: 4.74 10E12/L (ref 4.4–5.9)
SODIUM SERPL-SCNC: 137 MMOL/L (ref 133–144)
TRIGL SERPL-MCNC: 150 MG/DL
TSH SERPL DL<=0.005 MIU/L-ACNC: 1.2 MU/L (ref 0.4–4)
WBC # BLD AUTO: 4.9 10E9/L (ref 4–11)

## 2021-05-07 PROCEDURE — 85025 COMPLETE CBC W/AUTO DIFF WBC: CPT | Mod: ZL | Performed by: FAMILY MEDICINE

## 2021-05-07 PROCEDURE — 80053 COMPREHEN METABOLIC PANEL: CPT | Mod: ZL | Performed by: FAMILY MEDICINE

## 2021-05-07 PROCEDURE — G0103 PSA SCREENING: HCPCS | Mod: ZL | Performed by: FAMILY MEDICINE

## 2021-05-07 PROCEDURE — 84443 ASSAY THYROID STIM HORMONE: CPT | Mod: ZL | Performed by: FAMILY MEDICINE

## 2021-05-07 PROCEDURE — G0463 HOSPITAL OUTPT CLINIC VISIT: HCPCS

## 2021-05-07 PROCEDURE — 36415 COLL VENOUS BLD VENIPUNCTURE: CPT | Mod: ZL | Performed by: FAMILY MEDICINE

## 2021-05-07 PROCEDURE — 80061 LIPID PANEL: CPT | Mod: ZL | Performed by: FAMILY MEDICINE

## 2021-05-07 PROCEDURE — 99214 OFFICE O/P EST MOD 30 MIN: CPT | Performed by: FAMILY MEDICINE

## 2021-05-07 RX ORDER — SERTRALINE HYDROCHLORIDE 100 MG/1
TABLET, FILM COATED ORAL
COMMUNITY
Start: 2021-01-31 | End: 2021-06-04

## 2021-05-07 ASSESSMENT — PAIN SCALES - GENERAL: PAINLEVEL: NO PAIN (0)

## 2021-05-07 NOTE — PROGRESS NOTES
SUBJECTIVE:   Luis Perez is a 63 year old male who presents to clinic today for the following health issues:    Hypertension Follow-up      Outpatient blood pressures are not being checked.    Low Salt Diet: no added salt      Amount of exercise or physical activity: 6-7 days/week for an average of 15-30 minutes    Problems taking medications regularly: No    Medication side effects: none    Diet: regular (no restrictions)      GERD/Heartburn      Duration: ongoing concern    Description (location/character/radiation): on prescribed medication    Intensity:  mild    Accompanying signs and symptoms:  food getting stuck: no   nausea/vomiting/blood: no   abdominal pain: no   black/tarry or bloody stools: no :    History (similar episodes/previous evaluation): see medical record    Precipitating or alleviating factors:  worse with no particular food or drink.  current NSAID/Aspirin use: YES    Therapies tried and outcome: Omeprazole (Prilosec)    Abnormal Mood Symptoms      Duration: Months    Description:  Depression: no   Anxiety: YES  Panic attacks: YES     Accompanying signs and symptoms: see PHQ-9 and ELISABET scores    History (similar episodes/previous evaluation): None    Precipitating or alleviating factors: social situations  stress    Therapies tried and outcome: none       Problem list and histories reviewed & adjusted, as indicated.  Additional history: as documented    Patient Active Problem List   Diagnosis     Advanced care planning/counseling discussion     Essential hypertension     GERD     Comprehensive Medical Examination     Screening for prostate cancer     Special screening for malignant neoplasms, colon     Obesity (BMI 35.0-39.9) with comorbidity (H)     Diaphoresis     Neck pain     Dyspnea on exertion     Social anxiety disorder     Cigar smoker     Hypertriglyceridemia     Past Surgical History:   Procedure Laterality Date     COLONOSCOPY  9-     COLONOSCOPY  03/15/2018    repeat  colonoscopy in 5 years     UPPER GI ENDOSCOPY         Social History     Tobacco Use     Smoking status: Light Tobacco Smoker     Years: 37.00     Types: Cigars     Smokeless tobacco: Never Used   Substance Use Topics     Alcohol use: Yes     Comment: 2 glasses wine daily     Family History   Problem Relation Age of Onset     Cancer Father         Non-Hodgkin's lymphoma - cause of death     Diabetes Mother      Hypertension Mother      Allergies Sister      Allergies Brother      Asthma Brother      Hypertension Brother      Heart Disease No family hx of      Thyroid Disease No family hx of      Cancer - colorectal No family hx of          Current Outpatient Medications   Medication Sig Dispense Refill     aspirin (ASPIRIN LOW DOSE) 81 MG EC tablet Take 1 tablet by mouth daily.       Flaxseed, Linseed, (FLAXSEED OIL PO) Take 1,400 mg by mouth daily       lisinopril (ZESTRIL) 10 MG tablet TAKE 1 TABLET BY MOUTH EVERY DAY 90 tablet 1     metoprolol succinate ER (TOPROL-XL) 25 MG 24 hr tablet Take 1 tablet (25 mg) by mouth daily 90 tablet 3     omeprazole (PRILOSEC) 20 MG DR capsule TAKE 1 CAPSULE BY MOUTH DAILY BEFORE A MEAL 90 capsule 3     sertraline (ZOLOFT) 25 MG tablet Take 1 tablet (25 mg) by mouth daily 90 tablet 3     FLUZONE HIGH-DOSE QUADRIVALENT 0.7 ML JOSE DE JESUS injection PHARMACY ADMINISTERED       sertraline (ZOLOFT) 100 MG tablet TAKE HALF A TABLET (50 MG) BY MOUTH EVERY DAY       SHINGRIX injection PHARMACY ADMINISTERED       No Known Allergies  BP Readings from Last 3 Encounters:   05/07/21 124/78   11/18/20 137/88   02/07/20 126/80    Wt Readings from Last 3 Encounters:   05/07/21 109.9 kg (242 lb 3.2 oz)   11/18/20 108 kg (238 lb)   02/07/20 107.5 kg (237 lb)                    Reviewed and updated as needed this visit by clinical staff  Tobacco  Allergies  Meds   Med Hx  Surg Hx  Fam Hx  Soc Hx      Reviewed and updated as needed this visit by Provider                 ROS:  Constitutional, HEENT,  cardiovascular, pulmonary, gi and gu systems are negative, except as otherwise noted.    OBJECTIVE:     /78 (BP Location: Left arm, Patient Position: Chair, Cuff Size: Adult Large)   Pulse 75   Temp 96.5  F (35.8  C) (Tympanic)   Resp 16   Wt 109.9 kg (242 lb 3.2 oz)   SpO2 95%   BMI 36.29 kg/m    Body mass index is 36.29 kg/m .  Physical Exam   Constitutional: He is oriented to person, place, and time. He appears well-developed and well-nourished. No distress.   HENT:   Head: Normocephalic.   Right Ear: Tympanic membrane, external ear and ear canal normal.   Left Ear: Tympanic membrane, external ear and ear canal normal.   Nose: Nose normal.   Mouth/Throat: Oropharynx is clear and moist.   Eyes: Conjunctivae are normal.   Neck: Neck supple. No JVD present. No thyromegaly present.   Cardiovascular: Normal rate, regular rhythm, normal heart sounds and intact distal pulses. Exam reveals no gallop and no friction rub.   No murmur heard.  Pulmonary/Chest: Effort normal and breath sounds normal. He has no rales.   Musculoskeletal:         General: No edema.   Neurological: He is alert and oriented to person, place, and time.   Skin: Skin is warm and dry.   Psychiatric: He has a normal mood and affect.         Diagnostic Test Results:  No results found for any visits on 05/07/21.    ASSESSMENT/PLAN:     Essential hypertension  Goals reviewed.  Continue home BP monitoring and same medication regimen.  Follow up 6 months.   - CBC with platelets differential  - Comprehensive metabolic panel  - Lipid Profile  - TSH with free T4 reflex    Gastroesophageal reflux disease with esophagitis without hemorrhage  Stable. Continue proton pump inhibitor     Panic disorder without agoraphobia  Improved on sertraline (Zoloft)     Screening PSA (prostate specific antigen)  PSA drawn  - Prostate spec antigen screen        Kenneth Yuan MD  [facility]

## 2021-05-07 NOTE — NURSING NOTE
"Chief Complaint   Patient presents with     Recheck Medication       Initial /78 (BP Location: Left arm, Patient Position: Chair, Cuff Size: Adult Large)   Pulse 75   Temp 96.5  F (35.8  C) (Tympanic)   Resp 16   Wt 109.9 kg (242 lb 3.2 oz)   SpO2 95%   BMI 36.29 kg/m   Estimated body mass index is 36.29 kg/m  as calculated from the following:    Height as of 2/7/20: 1.74 m (5' 8.5\").    Weight as of this encounter: 109.9 kg (242 lb 3.2 oz).  Medication Reconciliation: complete  Suzanna Purcell LPN  "

## 2021-05-26 ENCOUNTER — TELEPHONE (OUTPATIENT)
Dept: FAMILY MEDICINE | Facility: OTHER | Age: 66
End: 2021-05-26

## 2021-05-26 NOTE — TELEPHONE ENCOUNTER
10:25 AM    Reason for Call: OVERBOOK    Patient needs a pre-op for cataract surgery on 06/22/2021. Please call patient to schedule appt.     The patient is requesting an appointment for ASAP with [facility].    Was an appointment offered for this call? Yes  If yes : Appointment type              Date 05/28/2021 Pt out of town    Preferred method for responding to this message: Telephone Call  What is your phone number ? [phone]    If we cannot reach you directly, may we leave a detailed response at the number you provided? Yes    Can this message wait until your PCP/provider returns, if unavailable today? Not applicable, Provider is in today    Jody Ellison

## 2021-06-04 ENCOUNTER — OFFICE VISIT (OUTPATIENT)
Dept: FAMILY MEDICINE | Facility: OTHER | Age: 66
End: 2021-06-04
Attending: FAMILY MEDICINE
Payer: MEDICARE

## 2021-06-04 VITALS
HEART RATE: 67 BPM | BODY MASS INDEX: 35.06 KG/M2 | SYSTOLIC BLOOD PRESSURE: 104 MMHG | WEIGHT: 234 LBS | DIASTOLIC BLOOD PRESSURE: 72 MMHG | RESPIRATION RATE: 18 BRPM | TEMPERATURE: 97 F | OXYGEN SATURATION: 95 %

## 2021-06-04 DIAGNOSIS — I10 ESSENTIAL HYPERTENSION: ICD-10-CM

## 2021-06-04 DIAGNOSIS — H25.813 COMBINED FORMS OF AGE-RELATED CATARACT OF BOTH EYES: ICD-10-CM

## 2021-06-04 DIAGNOSIS — Z01.818 PREOP GENERAL PHYSICAL EXAM: Primary | ICD-10-CM

## 2021-06-04 LAB
BASOPHILS # BLD AUTO: 0.1 10E9/L (ref 0–0.2)
BASOPHILS NFR BLD AUTO: 0.7 %
DIFFERENTIAL METHOD BLD: NORMAL
EOSINOPHIL # BLD AUTO: 0 10E9/L (ref 0–0.7)
EOSINOPHIL NFR BLD AUTO: 0.4 %
ERYTHROCYTE [DISTWIDTH] IN BLOOD BY AUTOMATED COUNT: 13.2 % (ref 10–15)
HCT VFR BLD AUTO: 45.1 % (ref 40–53)
HGB BLD-MCNC: 15.6 G/DL (ref 13.3–17.7)
IMM GRANULOCYTES # BLD: 0 10E9/L (ref 0–0.4)
IMM GRANULOCYTES NFR BLD: 0.3 %
LYMPHOCYTES # BLD AUTO: 1.1 10E9/L (ref 0.8–5.3)
LYMPHOCYTES NFR BLD AUTO: 14.5 %
MCH RBC QN AUTO: 33 PG (ref 26.5–33)
MCHC RBC AUTO-ENTMCNC: 34.6 G/DL (ref 31.5–36.5)
MCV RBC AUTO: 95 FL (ref 78–100)
MONOCYTES # BLD AUTO: 0.7 10E9/L (ref 0–1.3)
MONOCYTES NFR BLD AUTO: 8.7 %
NEUTROPHILS # BLD AUTO: 5.6 10E9/L (ref 1.6–8.3)
NEUTROPHILS NFR BLD AUTO: 75.4 %
NRBC # BLD AUTO: 0 10*3/UL
NRBC BLD AUTO-RTO: 0 /100
PLATELET # BLD AUTO: 220 10E9/L (ref 150–450)
RBC # BLD AUTO: 4.73 10E12/L (ref 4.4–5.9)
WBC # BLD AUTO: 7.4 10E9/L (ref 4–11)

## 2021-06-04 PROCEDURE — 93010 ELECTROCARDIOGRAM REPORT: CPT | Performed by: INTERNAL MEDICINE

## 2021-06-04 PROCEDURE — 99214 OFFICE O/P EST MOD 30 MIN: CPT | Performed by: FAMILY MEDICINE

## 2021-06-04 PROCEDURE — 93005 ELECTROCARDIOGRAM TRACING: CPT

## 2021-06-04 PROCEDURE — 85025 COMPLETE CBC W/AUTO DIFF WBC: CPT | Mod: ZL | Performed by: FAMILY MEDICINE

## 2021-06-04 PROCEDURE — G0463 HOSPITAL OUTPT CLINIC VISIT: HCPCS | Mod: 25

## 2021-06-04 PROCEDURE — 36415 COLL VENOUS BLD VENIPUNCTURE: CPT | Mod: ZL | Performed by: FAMILY MEDICINE

## 2021-06-04 ASSESSMENT — PAIN SCALES - GENERAL: PAINLEVEL: NO PAIN (0)

## 2021-06-04 NOTE — PATIENT INSTRUCTIONS

## 2021-06-04 NOTE — NURSING NOTE
"Chief Complaint   Patient presents with     Pre-Op Exam       Initial /72 (BP Location: Right arm, Patient Position: Sitting, Cuff Size: Adult Regular)   Pulse 67   Temp 97  F (36.1  C) (Tympanic)   Resp 18   Wt 106.1 kg (234 lb)   SpO2 95%   BMI 35.06 kg/m   Estimated body mass index is 35.06 kg/m  as calculated from the following:    Height as of 2/7/20: 1.74 m (5' 8.5\").    Weight as of this encounter: 106.1 kg (234 lb).  Medication Reconciliation: complete  Jennifer Cabrera LPN  "

## 2021-06-04 NOTE — PROGRESS NOTES
[facility] - [place]  [address]  [facility] [address]  Phone: [phone]  Primary Provider: Kartik Jewell  Pre-op Performing Provider: KARTIK JEWELL      PREOPERATIVE EVALUATION:  Today's date: 6/4/2021    Luis Perez is a 66 year old male who presents for a preoperative evaluation.    Surgical Information:  Surgery/Procedure: cataract surgery  Surgery Location: [facility]  Surgeon: Dr. Barillas  Surgery Date: right eye  06/22/2021 left eye 07/06/2021  Time of Surgery: TBD  Where patient plans to recover: At home with family  Fax number for surgical facility: [phone]    Type of Anesthesia Anticipated: to be determined    Assessment & Plan     The proposed surgical procedure is considered LOW risk.    Preop general physical exam  Preoperative examination completed  - CBC with platelets differential  - EKG 12-lead complete w/read - Clinics    Combined forms of age-related cataract of both eyes  Surgical management per Ophthalmology     Essential hypertension  Controlled on the current regimen       Risks and Recommendations:  The patient has the following additional risks and recommendations for perioperative complications:   - No identified additional risk factors other than previously addressed        RECOMMENDATION:  APPROVAL GIVEN to proceed with proposed procedure, without further diagnostic evaluation.      Subjective     HPI related to upcoming procedure: Luis has a history of bilateral cataracts and has had progressive vision change.  He was seen by Opthalmology where it was felt the patient would benefit from surgical management.  I was asked to see him for preoperative medical clearance.      Preop Questions 5/31/2021   1. Have you ever had a heart attack or stroke? No   2. Have you ever had surgery on your heart or blood vessels, such as a stent placement, a coronary artery bypass, or surgery on an artery in your head, neck, heart, or legs? No   3. Do you  have chest pain with activity? No   4. Do you have a history of  heart failure? No   5. Do you currently have a cold, bronchitis or symptoms of other infection? No   6. Do you have a cough, shortness of breath, or wheezing? No   7. Do you or anyone in your family have previous history of blood clots? No   8. Do you or does anyone in your family have a serious bleeding problem such as prolonged bleeding following surgeries or cuts? No   9. Have you ever had problems with anemia or been told to take iron pills? No   10. Have you had any abnormal blood loss such as black, tarry or bloody stools? No   11. Have you ever had a blood transfusion? No   12. Are you willing to have a blood transfusion if it is medically needed before, during, or after your surgery? Yes   13. Have you or any of your relatives ever had problems with anesthesia? No   14. Do you have sleep apnea, excessive snoring or daytime drowsiness? YES -    14a. Do you have a CPAP machine? No   15. Do you have any artifical heart valves or other implanted medical devices like a pacemaker, defibrillator, or continuous glucose monitor? No   16. Do you have artificial joints? No   17. Are you allergic to latex? No       Health Care Directive:  Patient does not have a Health Care Directive or Living Will: Patient states has Advance Directive and will bring in a copy to clinic.    Preoperative Review of :   reviewed - no record of controlled substances prescribed.      Status of Chronic Conditions:  HYPERTENSION - Patient has longstanding history of HTN , currently denies any symptoms referable to elevated blood pressure. Specifically denies chest pain, palpitations, dyspnea, orthopnea, PND or peripheral edema. Blood pressure readings have been in normal range. Current medication regimen is as listed below. Patient denies any side effects of medication.       Review of Systems  Constitutional, neuro, ENT, endocrine, pulmonary, cardiac, gastrointestinal,  genitourinary, musculoskeletal, integument and psychiatric systems are negative, except as otherwise noted.    Patient Active Problem List    Diagnosis Date Noted     Diaphoresis 11/18/2020     Priority: Medium     Neck pain 11/18/2020     Priority: Medium     Dyspnea on exertion 11/18/2020     Priority: Medium     Social anxiety disorder 11/18/2020     Priority: Medium     Cigar smoker 11/18/2020     Priority: Medium     Hypertriglyceridemia 11/18/2020     Priority: Medium     Obesity (BMI 35.0-39.9) with comorbidity (H) 12/18/2018     Priority: Medium     Screening for prostate cancer 12/18/2017     Priority: Medium     Special screening for malignant neoplasms, colon 12/18/2017     Priority: Medium     Comprehensive Medical Examination 11/28/2016     Priority: Medium     GERD 11/18/2015     Priority: Medium     IMO Regulatory Load OCT 2020       Advanced care planning/counseling discussion 11/13/2012     Priority: Medium     Advance Care Planning 11/28/2016: ACP Review of Chart / Resources Provided:  Reviewed chart for advance care plan.  Luis CHAMBERS Lindseyaz has no plan or code status on file however states presence of ACP document. Copy requested. Confirmed code status reflects current choices pending receipt of document/advance care plan review.  Confirmed/documented legally designated decision makers.  Added by Cecy Maldonado             Essential hypertension 06/03/2005     Priority: Medium     Problem list name updated by automated process. Provider to review        Past Medical History:   Diagnosis Date     GERD 2/22/2000     Hypertension 6/3/2005     Past Surgical History:   Procedure Laterality Date     COLONOSCOPY  9-     COLONOSCOPY  03/15/2018    repeat colonoscopy in 5 years     UPPER GI ENDOSCOPY       Current Outpatient Medications   Medication Sig Dispense Refill     aspirin (ASPIRIN LOW DOSE) 81 MG EC tablet Take 1 tablet by mouth daily.       Flaxseed, Linseed, (FLAXSEED OIL PO) Take 1,400  mg by mouth daily       FLUZONE HIGH-DOSE QUADRIVALENT 0.7 ML JOSE DE JESUS injection PHARMACY ADMINISTERED       lisinopril (ZESTRIL) 10 MG tablet TAKE 1 TABLET BY MOUTH EVERY DAY 90 tablet 1     metoprolol succinate ER (TOPROL-XL) 25 MG 24 hr tablet Take 1 tablet (25 mg) by mouth daily 90 tablet 3     omeprazole (PRILOSEC) 20 MG DR capsule TAKE 1 CAPSULE BY MOUTH DAILY BEFORE A MEAL 90 capsule 3     sertraline (ZOLOFT) 25 MG tablet Take 1 tablet (25 mg) by mouth daily 90 tablet 3     SHINGRIX injection PHARMACY ADMINISTERED         No Known Allergies     Social History     Tobacco Use     Smoking status: Light Tobacco Smoker     Years: 37.00     Types: Cigars     Smokeless tobacco: Never Used   Substance Use Topics     Alcohol use: Yes     Comment: 2 glasses wine daily     Family History   Problem Relation Age of Onset     Cancer Father         Non-Hodgkin's lymphoma - cause of death     Diabetes Mother      Hypertension Mother      Allergies Sister      Allergies Brother      Asthma Brother      Hypertension Brother      Heart Disease No family hx of      Thyroid Disease No family hx of      Cancer - colorectal No family hx of      History   Drug Use No         Objective     /72 (BP Location: Right arm, Patient Position: Sitting, Cuff Size: Adult Regular)   Pulse 67   Temp 97  F (36.1  C) (Tympanic)   Resp 18   Wt 106.1 kg (234 lb)   SpO2 95%   BMI 35.06 kg/m      Physical Exam    GENERAL APPEARANCE: healthy, alert and no distress     EYES: EOMI,  PERRL     HENT: ear canals and TM's normal and nose and mouth without ulcers or lesions     NECK: no adenopathy, no asymmetry, masses, or scars and thyroid normal to palpation     RESP: lungs clear to auscultation - no rales, rhonchi or wheezes     CV: regular rates and rhythm, normal S1 S2, no S3 or S4 and no murmur, click or rub     ABDOMEN:  soft, nontender, no HSM or masses and bowel sounds normal     MS: extremities normal- no gross deformities noted, no  evidence of inflammation in joints, FROM in all extremities.     SKIN: no suspicious lesions or rashes     NEURO: Normal strength and tone, sensory exam grossly normal, mentation intact and speech normal     PSYCH: mentation appears normal. and affect normal/bright     LYMPHATICS: No cervical adenopathy    Recent Labs   Lab Test 05/07/21  1117 12/06/19  0936   HGB 15.4 16.0    194    139   POTASSIUM 4.6 4.4   CR 0.98 1.02        Diagnostics:  Recent Results (from the past 720 hour(s))   CBC with platelets differential    Collection Time: 06/04/21  2:01 PM   Result Value Ref Range    WBC 7.4 4.0 - 11.0 10e9/L    RBC Count 4.73 4.4 - 5.9 10e12/L    Hemoglobin 15.6 13.3 - 17.7 g/dL    Hematocrit 45.1 40.0 - 53.0 %    MCV 95 78 - 100 fl    MCH 33.0 26.5 - 33.0 pg    MCHC 34.6 31.5 - 36.5 g/dL    RDW 13.2 10.0 - 15.0 %    Platelet Count 220 150 - 450 10e9/L    Diff Method Automated Method     % Neutrophils 75.4 %    % Lymphocytes 14.5 %    % Monocytes 8.7 %    % Eosinophils 0.4 %    % Basophils 0.7 %    % Immature Granulocytes 0.3 %    Nucleated RBCs 0 0 /100    Absolute Neutrophil 5.6 1.6 - 8.3 10e9/L    Absolute Lymphocytes 1.1 0.8 - 5.3 10e9/L    Absolute Monocytes 0.7 0.0 - 1.3 10e9/L    Absolute Eosinophils 0.0 0.0 - 0.7 10e9/L    Absolute Basophils 0.1 0.0 - 0.2 10e9/L    Abs Immature Granulocytes 0.0 0 - 0.4 10e9/L    Absolute Nucleated RBC 0.0       EKG: appears normal, NSR, normal axis, normal intervals, no acute ST/T changes c/w ischemia, no LVH by voltage criteria, unchanged from previous tracings    Revised Cardiac Risk Index (RCRI):  The patient has the following serious cardiovascular risks for perioperative complications:   - No serious cardiac risks = 0 points     RCRI Interpretation: 0 points: Class I (very low risk - 0.4% complication rate)           Signed Electronically by: Kenneth Yuan MD  Copy of this evaluation report is provided to requesting physician.

## 2021-06-14 PROBLEM — Z12.5 SCREENING FOR PROSTATE CANCER: Status: ACTIVE | Noted: 2017-12-18

## 2021-06-14 PROBLEM — Z12.11 SPECIAL SCREENING FOR MALIGNANT NEOPLASMS, COLON: Status: ACTIVE | Noted: 2017-12-18

## 2021-08-08 DIAGNOSIS — I10 ESSENTIAL HYPERTENSION: ICD-10-CM

## 2021-08-10 RX ORDER — LISINOPRIL 10 MG/1
TABLET ORAL
Qty: 90 TABLET | Refills: 1 | Status: SHIPPED | OUTPATIENT
Start: 2021-08-10

## 2021-08-10 NOTE — TELEPHONE ENCOUNTER
Lisinopril      Last Written Prescription Date:  12/24/20  Last Fill Quantity: 90,   # refills: 1  Last Office Visit: 6/4/21  Future Office visit:       Routing refill request to provider for review/approval because:

## 2021-10-03 ENCOUNTER — HEALTH MAINTENANCE LETTER (OUTPATIENT)
Age: 66
End: 2021-10-03

## 2021-11-05 DIAGNOSIS — F40.10 SOCIAL ANXIETY DISORDER: ICD-10-CM

## 2021-11-08 RX ORDER — SERTRALINE HYDROCHLORIDE 25 MG/1
TABLET, FILM COATED ORAL
Qty: 90 TABLET | Refills: 3 | Status: SHIPPED | OUTPATIENT
Start: 2021-11-08

## 2021-11-08 NOTE — TELEPHONE ENCOUNTER
Zoloft       Last Written Prescription Date:  11/18/2020  Last Fill Quantity: 90,   # refills: 3  Last Office Visit: 11/18/2020  Future Office visit:

## 2021-11-20 DIAGNOSIS — K21.00 GASTROESOPHAGEAL REFLUX DISEASE WITH ESOPHAGITIS WITHOUT HEMORRHAGE: ICD-10-CM

## 2021-11-20 DIAGNOSIS — I10 BENIGN ESSENTIAL HYPERTENSION: ICD-10-CM

## 2021-11-23 RX ORDER — METOPROLOL SUCCINATE 25 MG/1
TABLET, EXTENDED RELEASE ORAL
Qty: 90 TABLET | Refills: 1 | Status: SHIPPED | OUTPATIENT
Start: 2021-11-23

## 2022-01-30 DIAGNOSIS — I10 ESSENTIAL HYPERTENSION: ICD-10-CM

## 2022-02-01 NOTE — TELEPHONE ENCOUNTER
Needs new PCP    Lisinopril      Last Written Prescription Date:  11.4.21  Last Fill Quantity: #90,   # refills: 0  Last Office Visit: 6.4.21  Future Office visit:       Routing refill request to provider for review/approval because:

## 2022-02-03 RX ORDER — LISINOPRIL 10 MG/1
TABLET ORAL
Qty: 90 TABLET | Refills: 0 | OUTPATIENT
Start: 2022-02-03

## 2022-02-03 NOTE — TELEPHONE ENCOUNTER
Attempt # 2  Outcome: Talked with Patient    Comment: He informed he does not want to go to [place] anymore. Informed to send his refill to his new primary

## 2022-05-14 ENCOUNTER — HEALTH MAINTENANCE LETTER (OUTPATIENT)
Age: 67
End: 2022-05-14

## 2022-05-21 DIAGNOSIS — I10 BENIGN ESSENTIAL HYPERTENSION: ICD-10-CM

## 2022-05-21 DIAGNOSIS — K21.00 GASTROESOPHAGEAL REFLUX DISEASE WITH ESOPHAGITIS WITHOUT HEMORRHAGE: ICD-10-CM

## 2022-05-24 NOTE — TELEPHONE ENCOUNTER
Prilosec,toprol       Last Written Prescription Date:  11-23-21  Last Fill Quantity: 90,   # refills: 1  Last Office Visit: 6-4-21  Future Office visit:         No est care set up.

## 2022-05-24 NOTE — TELEPHONE ENCOUNTER
Attempt # 1  Outcome: Left Message   Comment: LM for patient to return call to clinic to schedule and establish care appointment with a new provider.

## 2022-05-27 NOTE — TELEPHONE ENCOUNTER
Attempt # 2  Outcome: Left Message   Comment: LM for patient to return call to clinic to schedule and establish care appointment with a new provider.

## 2022-06-01 NOTE — TELEPHONE ENCOUNTER
Unable to reach patient by phone to schedule Establish care appointment, a letter has been sent to patient's address on file.

## 2022-06-03 RX ORDER — METOPROLOL SUCCINATE 25 MG/1
TABLET, EXTENDED RELEASE ORAL
Qty: 90 TABLET | Refills: 1 | OUTPATIENT
Start: 2022-06-03

## 2022-09-04 ENCOUNTER — HEALTH MAINTENANCE LETTER (OUTPATIENT)
Age: 67
End: 2022-09-04

## 2023-06-03 ENCOUNTER — HEALTH MAINTENANCE LETTER (OUTPATIENT)
Age: 68
End: 2023-06-03